# Patient Record
Sex: FEMALE | Race: WHITE | NOT HISPANIC OR LATINO | Employment: UNEMPLOYED | URBAN - METROPOLITAN AREA
[De-identification: names, ages, dates, MRNs, and addresses within clinical notes are randomized per-mention and may not be internally consistent; named-entity substitution may affect disease eponyms.]

---

## 2019-12-03 ENCOUNTER — OFFICE VISIT (OUTPATIENT)
Dept: ENDOCRINOLOGY | Facility: CLINIC | Age: 39
End: 2019-12-03

## 2019-12-03 VITALS
BODY MASS INDEX: 36.23 KG/M2 | WEIGHT: 212.2 LBS | SYSTOLIC BLOOD PRESSURE: 120 MMHG | HEART RATE: 80 BPM | DIASTOLIC BLOOD PRESSURE: 80 MMHG | OXYGEN SATURATION: 98 % | HEIGHT: 64 IN

## 2019-12-03 DIAGNOSIS — E03.9 ACQUIRED HYPOTHYROIDISM: ICD-10-CM

## 2019-12-03 DIAGNOSIS — IMO0002 DIABETES MELLITUS TYPE 1, UNCONTROLLED, WITH COMPLICATIONS: Primary | ICD-10-CM

## 2019-12-03 LAB
GLUCOSE BLDC GLUCOMTR-MCNC: 190 MG/DL (ref 70–130)
HBA1C MFR BLD: 5.6 %

## 2019-12-03 PROCEDURE — 83036 HEMOGLOBIN GLYCOSYLATED A1C: CPT | Performed by: INTERNAL MEDICINE

## 2019-12-03 PROCEDURE — 82947 ASSAY GLUCOSE BLOOD QUANT: CPT | Performed by: INTERNAL MEDICINE

## 2019-12-03 PROCEDURE — 99244 OFF/OP CNSLTJ NEW/EST MOD 40: CPT | Performed by: INTERNAL MEDICINE

## 2019-12-03 RX ORDER — BRIMONIDINE TARTRATE/TIMOLOL 0.2%-0.5%
DROPS OPHTHALMIC (EYE)
COMMUNITY
Start: 2019-12-02

## 2019-12-03 RX ORDER — LEVOTHYROXINE SODIUM 112 MCG
112 TABLET ORAL DAILY
Qty: 90 TABLET | Refills: 3 | Status: SHIPPED | OUTPATIENT
Start: 2019-12-03 | End: 2020-03-03 | Stop reason: SDUPTHER

## 2019-12-03 RX ORDER — NETARSUDIL 0.2 MG/ML
SOLUTION/ DROPS OPHTHALMIC; TOPICAL
COMMUNITY
Start: 2019-10-17

## 2019-12-03 RX ORDER — OMEPRAZOLE 40 MG/1
40 CAPSULE, DELAYED RELEASE ORAL DAILY
COMMUNITY
End: 2022-08-18 | Stop reason: DRUGHIGH

## 2019-12-03 RX ORDER — LOSARTAN POTASSIUM 25 MG/1
TABLET ORAL
COMMUNITY
Start: 2019-11-19

## 2019-12-03 RX ORDER — SYRING-NEEDL,DISP,INSUL,0.3 ML 31 GX5/16"
SYRINGE, EMPTY DISPOSABLE MISCELLANEOUS
COMMUNITY
Start: 2019-10-28 | End: 2020-08-11 | Stop reason: SDUPTHER

## 2019-12-03 RX ORDER — TACROLIMUS 0.5 MG/1
CAPSULE ORAL
COMMUNITY
Start: 2019-11-22

## 2019-12-03 RX ORDER — BRINZOLAMIDE/BRIMONIDINE TARTRATE 10; 2 MG/ML; MG/ML
SUSPENSION/ DROPS OPHTHALMIC
COMMUNITY
Start: 2019-10-09 | End: 2020-08-06

## 2019-12-03 RX ORDER — NORETHINDRONE ACETATE AND ETHINYL ESTRADIOL, ETHINYL ESTRADIOL AND FERROUS FUMARATE 1MG-10(24)
KIT ORAL
COMMUNITY
Start: 2019-11-14 | End: 2022-04-26 | Stop reason: ALTCHOICE

## 2019-12-03 RX ORDER — MYCOPHENOLATE MOFETIL 500 MG/1
TABLET ORAL
COMMUNITY
Start: 2019-11-18

## 2019-12-03 RX ORDER — LEVOTHYROXINE SODIUM 112 MCG
TABLET ORAL
COMMUNITY
Start: 2019-09-14 | End: 2019-12-03 | Stop reason: SDUPTHER

## 2019-12-03 RX ORDER — ERYTHROMYCIN 5 MG/G
OINTMENT OPHTHALMIC
COMMUNITY
Start: 2019-12-02 | End: 2020-08-06

## 2020-02-20 ENCOUNTER — TELEPHONE (OUTPATIENT)
Dept: ENDOCRINOLOGY | Facility: CLINIC | Age: 40
End: 2020-02-20

## 2020-02-20 NOTE — TELEPHONE ENCOUNTER
PATIENT CALLED TO REQUEST HUMALOG REFILL. PLEASE SEND ONE VIAL TO [facility] IN [place] THEN SEND THREE MONTH SUPPLY TO [facility]. PATIENT IS CONCERNED THAT SHE MAY RUN OUT BEFORE MAIL ORDER CAN DELIVER.       CALL BACK [phone]

## 2020-02-21 RX ORDER — INSULIN LISPRO 100 [IU]/ML
INJECTION, SOLUTION INTRAVENOUS; SUBCUTANEOUS
Qty: 30 ML | Refills: 3 | Status: SHIPPED | OUTPATIENT
Start: 2020-02-21 | End: 2020-02-21

## 2020-03-03 ENCOUNTER — OFFICE VISIT (OUTPATIENT)
Dept: ENDOCRINOLOGY | Facility: CLINIC | Age: 40
End: 2020-03-03

## 2020-03-03 VITALS
WEIGHT: 208.1 LBS | SYSTOLIC BLOOD PRESSURE: 118 MMHG | HEIGHT: 64 IN | OXYGEN SATURATION: 98 % | BODY MASS INDEX: 35.53 KG/M2 | HEART RATE: 76 BPM | DIASTOLIC BLOOD PRESSURE: 76 MMHG

## 2020-03-03 DIAGNOSIS — IMO0002 DIABETES MELLITUS TYPE 1, UNCONTROLLED, WITH COMPLICATIONS: Primary | ICD-10-CM

## 2020-03-03 DIAGNOSIS — Z46.81 INSULIN PUMP TITRATION: ICD-10-CM

## 2020-03-03 DIAGNOSIS — E03.9 ACQUIRED HYPOTHYROIDISM: ICD-10-CM

## 2020-03-03 LAB
EXPIRATION DATE: ABNORMAL
EXPIRATION DATE: NORMAL
GLUCOSE BLDC GLUCOMTR-MCNC: 158 MG/DL (ref 70–130)
HBA1C MFR BLD: 6.3 %
Lab: ABNORMAL
Lab: NORMAL

## 2020-03-03 PROCEDURE — 99214 OFFICE O/P EST MOD 30 MIN: CPT | Performed by: INTERNAL MEDICINE

## 2020-03-03 PROCEDURE — 83036 HEMOGLOBIN GLYCOSYLATED A1C: CPT | Performed by: INTERNAL MEDICINE

## 2020-03-03 PROCEDURE — 82947 ASSAY GLUCOSE BLOOD QUANT: CPT | Performed by: INTERNAL MEDICINE

## 2020-03-03 RX ORDER — LEVOTHYROXINE SODIUM 112 MCG
112 TABLET ORAL DAILY
Qty: 90 TABLET | Refills: 3 | Status: SHIPPED | OUTPATIENT
Start: 2020-03-03 | End: 2020-03-04 | Stop reason: SDUPTHER

## 2020-03-03 NOTE — PROGRESS NOTES
Subjective:     Chief Complaint   Patient presents with   • Diabetes     Follow UP   • Hypothyroidism      Ellie Meyer is a 39 y.o. female who is is being seen for follow-up  Type 1 diabetes mellitus.    The initial diagnosis of diabetes was made in childhood and she had variably  Controlled diabetes with multiple complications. Now she has a tight control < 6.5 most of the time.   Diabetic complications: nephropathy, retinopathy s/p surgeries vitrectomy and multiple laser surgeries and injections; kidney disease s/p renal transplant. She is followed with the transplant team in [facility].     Current diabetic medications include insulin Humalog via insulin pump [employer]. She has 670G but doesn't use sensor because of the cost. She prefers Humalog, feels that it works better.  Her glucose is higher lately.     Monitoring  - checks glucose  7 times per day  Insulin pump downloaded and reviewed the data. Glucose is  most of the time. Occasional 190 after meals in the evening.   Hypoglycemia: occasional    Other med problems:Hypothyroidism is managed with levothyroxine 112 mcg daily. TSh was normal in 12/2019 1.62        Past Medical History:   Diagnosis Date   • Acid reflux    • Diabetes mellitus type I (CMS/[facility])    • Hypothyroidism    • PCOS (polycystic ovarian syndrome)      The following portions of the patient's history were reviewed and updated as appropriate: allergies, current medications, past family history, past social history, past surgical history and problem list.    MEDICATIONS    Current Outpatient Medications:   •  COMBIGAN 0.2-0.5 % ophthalmic solution, , Disp: , Rfl:   •  erythromycin (ROMYCIN) 5 MG/GM ophthalmic ointment, , Disp: , Rfl:   •  HUMALOG 100 UNIT/ML injection, 80 units via insulin pump, Disp: 90 mL, Rfl: 1  •  HUMALOG 100 UNIT/ML injection, 80 units via Insulin Pump Daily, Disp: 90 mL, Rfl: 3  •  LO LOESTRIN FE 1 MG-10 MCG / 10 MCG tablet, , Disp: , Rfl:   •  losartan  "(COZAAR) 25 MG tablet, , Disp: , Rfl:   •  mycophenolate (CELLCEPT) 500 MG tablet, , Disp: , Rfl:   •  omeprazole (priLOSEC) 40 MG capsule, Take 40 mg by mouth Daily., Disp: , Rfl:   •  RHOPRESSA 0.02 % solution, , Disp: , Rfl:   •  SIMBRINZA 1-0.2 % suspension, , Disp: , Rfl:   •  SYNTHROID 112 MCG tablet, Take 1 tablet by mouth Daily., Disp: 90 tablet, Rfl: 3  •  tacrolimus (PROGRAF) 0.5 MG capsule, , Disp: , Rfl:   •  TRUEPLUS INSULIN SYRINGE 31G X 5/16\" 0.3 ML misc, , Disp: , Rfl:     Review of Systems  Review of Systems   Constitutional: Positive for fatigue.   Eyes: Positive for redness and visual disturbance.   Genitourinary: Negative for flank pain, menstrual problem and pelvic pressure.   Neurological: Positive for dizziness and headache.   Psychiatric/Behavioral: Positive for sleep disturbance.   All other systems reviewed and are negative.        Objective:      /76   Pulse 76   Ht 162.6 cm (64\")   Wt 94.4 kg (208 lb 1.6 oz)   SpO2 98%   BMI 35.72 kg/m² Body mass index is 35.72 kg/m².  Physical Exam   Constitutional: She is oriented to person, place, and time. She appears well-developed and well-nourished.   HENT:   Head: Normocephalic and atraumatic.   Mouth/Throat: Oropharynx is clear and moist.   Cardiovascular: Normal rate, regular rhythm, normal heart sounds and intact distal pulses.   Pulmonary/Chest: Effort normal and breath sounds normal.   Musculoskeletal: She exhibits no edema.   Neurological: She is alert and oriented to person, place, and time.   Psychiatric: She has a normal mood and affect. Thought content normal.           LABS AND IMAGING    Labs:   Lab Results   Component Value Date    HGBA1C 6.3 03/03/2020    HGBA1C 5.6 12/03/2019     Office Visit on 03/03/2020   Component Date Value Ref Range Status   • Glucose 03/03/2020 158* 70 - 130 mg/dL Final   • Lot Number 03/03/2020 [id number]   Final   • Expiration Date 03/03/2020 2020/08/22   Final   • Hemoglobin A1C 03/03/2020 6.3  % " Final   • Lot Number 03/03/2020 [id number]   Final   • Expiration Date 03/03/2020 2021-12-10   Final             Assessment:         Diagnoses and all orders for this visit:    Diabetes mellitus type 1, uncontrolled, with complications (CMS/[facility])  -     POC Glucose Fingerstick  -     POC Glycosylated Hemoglobin (Hb A1C)    Acquired hypothyroidism    Insulin pump titration    Other orders  -     HUMALOG 100 UNIT/ML injection; 80 units via Insulin Pump Daily  -     SYNTHROID 112 MCG tablet; Take 1 tablet by mouth Daily.        Plan:      -Insulin pump downloaded and data reviewed. Glucose is higher and adjustments were made. I have noted that she is using manual bolus, encouraged to use bolus wizard. I have made some adjustments on the carb ratio and   -Humalog rx sent and Meds refilled.   She is up to date with vaccination and screening.   Check thyroid function test - 1.65. Levothyroxine 112 mcg daily     Sees eye specialist every 4 weeks. Transplant team is once a year. Labs done with transplant team     Follow-up in 3 months

## 2020-03-04 ENCOUNTER — TELEPHONE (OUTPATIENT)
Dept: ENDOCRINOLOGY | Facility: CLINIC | Age: 40
End: 2020-03-04

## 2020-03-04 RX ORDER — LEVOTHYROXINE SODIUM 112 UG/1
112 TABLET ORAL DAILY
Qty: 90 TABLET | Refills: 3 | Status: SHIPPED | OUTPATIENT
Start: 2020-03-04 | End: 2020-03-04 | Stop reason: SDUPTHER

## 2020-03-04 RX ORDER — LEVOTHYROXINE SODIUM 112 UG/1
112 TABLET ORAL DAILY
Qty: 90 TABLET | Refills: 3 | Status: SHIPPED | OUTPATIENT
Start: 2020-03-04 | End: 2021-02-24 | Stop reason: SDUPTHER

## 2020-03-04 NOTE — TELEPHONE ENCOUNTER
[facility] in for [facility]    pts number  if you have questions   [phone]

## 2020-04-29 ENCOUNTER — TELEPHONE (OUTPATIENT)
Dept: ENDOCRINOLOGY | Facility: CLINIC | Age: 40
End: 2020-04-29

## 2020-04-29 NOTE — TELEPHONE ENCOUNTER
PATIENT NEEDS PRESCRIPTION FOR PUMP SUPPLIES TO BE SENT TO [facility]. PATIENT ID NUMBER IS [id number]. # [phone] [facility] PUMP SUPPLY LINE

## 2020-04-29 NOTE — TELEPHONE ENCOUNTER
Called and spoke with [facility] and they have it down that Dr Colvin sees this patient so they would not send anything to us. They stated the patient needs to call and update a provider change so they can send the order form to us to complete.     LVM and told pt this information.

## 2020-05-27 ENCOUNTER — TELEPHONE (OUTPATIENT)
Dept: ENDOCRINOLOGY | Facility: CLINIC | Age: 40
End: 2020-05-27

## 2020-05-27 NOTE — TELEPHONE ENCOUNTER
Returned [facility] called, advised them that it was faxed on 5/7/20 by Isabela, however no fax confirmation cover sheet.  I re faxed to: [phone] per [facility] RRequest

## 2020-05-27 NOTE — TELEPHONE ENCOUNTER
[facility] FAXED US A REQUEST FOR ORDERS FROM DR. SANFORD ON 5/22/2020. THEY ARE CALLING TO CHECK STATUS OF PAPERWORK.  PHONE NUMBER [phone]

## 2020-08-06 ENCOUNTER — OFFICE VISIT (OUTPATIENT)
Dept: ENDOCRINOLOGY | Facility: CLINIC | Age: 40
End: 2020-08-06

## 2020-08-06 VITALS
BODY MASS INDEX: 33.84 KG/M2 | HEART RATE: 76 BPM | DIASTOLIC BLOOD PRESSURE: 74 MMHG | WEIGHT: 198.2 LBS | RESPIRATION RATE: 18 BRPM | HEIGHT: 64 IN | SYSTOLIC BLOOD PRESSURE: 122 MMHG | OXYGEN SATURATION: 98 %

## 2020-08-06 DIAGNOSIS — IMO0002 DIABETES MELLITUS TYPE 1, UNCONTROLLED, WITH COMPLICATIONS: Primary | ICD-10-CM

## 2020-08-06 DIAGNOSIS — Z46.81 INSULIN PUMP TITRATION: ICD-10-CM

## 2020-08-06 DIAGNOSIS — L65.9 HAIR LOSS: ICD-10-CM

## 2020-08-06 DIAGNOSIS — E03.9 ACQUIRED HYPOTHYROIDISM: ICD-10-CM

## 2020-08-06 LAB
EXPIRATION DATE: NORMAL
EXPIRATION DATE: NORMAL
GLUCOSE BLDC GLUCOMTR-MCNC: 93 MG/DL (ref 70–130)
HBA1C MFR BLD: 5.8 %
Lab: NORMAL
Lab: NORMAL

## 2020-08-06 PROCEDURE — 83036 HEMOGLOBIN GLYCOSYLATED A1C: CPT | Performed by: INTERNAL MEDICINE

## 2020-08-06 PROCEDURE — 82947 ASSAY GLUCOSE BLOOD QUANT: CPT | Performed by: INTERNAL MEDICINE

## 2020-08-06 PROCEDURE — 99214 OFFICE O/P EST MOD 30 MIN: CPT | Performed by: INTERNAL MEDICINE

## 2020-08-06 NOTE — PROGRESS NOTES
Subjective:     Chief Complaint   Patient presents with   • Diabetes   • Hypothyroidism      Ellie Meyer is a 40 y.o. female who is is being seen for follow-up  Type 1 diabetes mellitus.    The initial diagnosis of diabetes was made in childhood and she had variably  Controlled diabetes with multiple complications.  Diabetic complications: nephropathy, retinopathy s/p surgeries vitrectomy and multiple laser surgeries and injections; kidney disease s/p renal transplant. She is followed with the transplant team in [facility].     Current diabetic medications include insulin Humalog via insulin pump [employer]. She has 670G but doesn't use sensor because of the cost. She prefers Humalog, feels that it works better.      Monitoring  - checks glucose  7 times per day  Insulin pump downloaded and reviewed the data. Glucose is  most of the time. Occasional 190 after meals in the evening.   Hypoglycemia: occasional    Hypothyroidism is managed with levothyroxine 112 mcg daily.     C/o hair loss that is working worse. No menstrual problems - takes low dose OCP continuously. She is not fasting today.     Past Medical History:   Diagnosis Date   • Acid reflux    • Diabetes mellitus type I (CMS/HCC)    • Hypothyroidism    • PCOS (polycystic ovarian syndrome)      The following portions of the patient's history were reviewed and updated as appropriate: allergies, current medications, past family history, past social history, past surgical history and problem list.    MEDICATIONS    Current Outpatient Medications:   •  COMBIGAN 0.2-0.5 % ophthalmic solution, , Disp: , Rfl:   •  HUMALOG 100 UNIT/ML injection, 80 units via Insulin Pump Daily, Disp: 90 mL, Rfl: 3  •  levothyroxine (SYNTHROID) 112 MCG tablet, Take 1 tablet by mouth Daily., Disp: 90 tablet, Rfl: 3  •  LO LOESTRIN FE 1 MG-10 MCG / 10 MCG tablet, , Disp: , Rfl:   •  losartan (COZAAR) 25 MG tablet, , Disp: , Rfl:   •  mycophenolate (CELLCEPT) 500 MG tablet, ,  "Disp: , Rfl:   •  omeprazole (priLOSEC) 40 MG capsule, Take 40 mg by mouth Daily., Disp: , Rfl:   •  RHOPRESSA 0.02 % solution, , Disp: , Rfl:   •  tacrolimus (PROGRAF) 0.5 MG capsule, , Disp: , Rfl:   •  TRUEPLUS INSULIN SYRINGE 31G X 5/16\" 0.3 ML misc, , Disp: , Rfl:     Review of Systems  Review of Systems   Constitutional: Positive for fatigue.   Eyes: Positive for redness and visual disturbance.   Genitourinary: Negative for flank pain, menstrual problem and pelvic pressure.   Skin:        Hair loss   Neurological: Positive for dizziness and headache.   Psychiatric/Behavioral: Positive for sleep disturbance.   All other systems reviewed and are negative.        Objective:      /74   Pulse 76   Resp 18   Ht 162.6 cm (64\")   Wt 89.9 kg (198 lb 3.2 oz)   SpO2 98%   BMI 34.02 kg/m² Body mass index is 34.02 kg/m².  Physical Exam   Constitutional: She is oriented to person, place, and time. She appears well-developed and well-nourished.   HENT:   Head: Normocephalic and atraumatic.   Cardiovascular: Normal rate, regular rhythm, normal heart sounds and intact distal pulses.   Pulmonary/Chest: Effort normal and breath sounds normal.   Musculoskeletal: She exhibits no edema.   Neurological: She is alert and oriented to person, place, and time.   Psychiatric: She has a normal mood and affect. Thought content normal.           LABS AND IMAGING    Labs:   Lab Results   Component Value Date    HGBA1C 5.8 08/06/2020    HGBA1C 6.3 03/03/2020    HGBA1C 5.6 12/03/2019     Office Visit on 08/06/2020   Component Date Value Ref Range Status   • Glucose 08/06/2020 93  70 - 130 mg/dL Final   • Lot Number 08/06/2020 [id number]   Final   • Expiration Date 08/06/2020 12/28/2020   Final   • Hemoglobin A1C 08/06/2020 5.8  % Final   • Lot Number 08/06/2020 [id number]   Final   • Expiration Date 08/06/2020 03/13/2022   Final             Assessment:         Diagnoses and all orders for this visit:    Diabetes mellitus type 1, " uncontrolled, with complications (CMS/[facility])  -     POC Glucose  -     POC Glycosylated Hemoglobin (Hb A1C)  -     Vitamin D 25 Hydroxy; Future    Acquired hypothyroidism  -     TSH; Future  -     T4, Free; Future    Insulin pump titration  -     Lipid Panel; Future  -     Comprehensive Metabolic Panel; Future    Hair loss        Plan:      -Insulin pump downloaded and data reviewed. Glucose is higher and adjustments were made. I have noted that she is using manual bolus, encouraged to use bolus wizard. I have made some adjustments on the carb ratio and   -Humalog rx sent and Meds refilled.   - recommend the dual bolus at dinner.     Check thyroid function test - cont Levothyroxine 112 mcg daily     Sees eye specialist every 4 weeks. Transplant team is once a year.     She will return for fasting labs next week.       Follow-up in 3 months

## 2020-08-11 RX ORDER — SYRING-NEEDL,DISP,INSUL,0.3 ML 31 GX5/16"
SYRINGE, EMPTY DISPOSABLE MISCELLANEOUS
Qty: 100 EACH | Refills: 5 | Status: SHIPPED | OUTPATIENT
Start: 2020-08-11 | End: 2023-03-15 | Stop reason: SDUPTHER

## 2020-08-11 NOTE — TELEPHONE ENCOUNTER
PATIENT IS HAVING ISSUES TRYING TO GET [employer] SUPPLIES FROM THE COMPANY SHE GETS THEM FROM. THEY ARE CALLING TO SEE IF WE CAN GIVE SOME SAMPLES OF [employer] SUPPLIES TILL THEY CAN GET WHAT THEY NEED FROM SUPPLIER. PHONE [phone]. PART NUMBER IS [id number]. THEY ALSO NEED NEEDLES TO BE SENT TO PHARMACY.

## 2020-08-11 NOTE — TELEPHONE ENCOUNTER
We have faxed the necessary document to [facility] twice the past 3 months.  Actually a total of 7 times.  I refaxed all the previous requests to [facility] with [facility] marked on top.  I know we have some samples for [employer] but I am uncertain as to what she needs for her pump.  Do you mind to help me locate the correct ones and I will notify patient?   They are in the supply room

## 2020-11-04 ENCOUNTER — LAB (OUTPATIENT)
Dept: LAB | Facility: HOSPITAL | Age: 40
End: 2020-11-04

## 2020-11-04 ENCOUNTER — OFFICE VISIT (OUTPATIENT)
Dept: ENDOCRINOLOGY | Facility: CLINIC | Age: 40
End: 2020-11-04

## 2020-11-04 VITALS
TEMPERATURE: 99.3 F | HEART RATE: 76 BPM | HEIGHT: 64 IN | BODY MASS INDEX: 33.8 KG/M2 | DIASTOLIC BLOOD PRESSURE: 80 MMHG | SYSTOLIC BLOOD PRESSURE: 120 MMHG | WEIGHT: 198 LBS | OXYGEN SATURATION: 98 %

## 2020-11-04 DIAGNOSIS — IMO0002 DIABETES MELLITUS TYPE 1, UNCONTROLLED, WITH COMPLICATIONS: Primary | ICD-10-CM

## 2020-11-04 DIAGNOSIS — Z46.81 INSULIN PUMP TITRATION: ICD-10-CM

## 2020-11-04 DIAGNOSIS — E03.9 ACQUIRED HYPOTHYROIDISM: ICD-10-CM

## 2020-11-04 LAB
EXPIRATION DATE: ABNORMAL
EXPIRATION DATE: NORMAL
GLUCOSE BLDC GLUCOMTR-MCNC: 69 MG/DL (ref 70–130)
HBA1C MFR BLD: 6 %
Lab: ABNORMAL
Lab: NORMAL

## 2020-11-04 PROCEDURE — 82306 VITAMIN D 25 HYDROXY: CPT | Performed by: INTERNAL MEDICINE

## 2020-11-04 PROCEDURE — 82947 ASSAY GLUCOSE BLOOD QUANT: CPT | Performed by: INTERNAL MEDICINE

## 2020-11-04 PROCEDURE — 83036 HEMOGLOBIN GLYCOSYLATED A1C: CPT | Performed by: INTERNAL MEDICINE

## 2020-11-04 PROCEDURE — 99214 OFFICE O/P EST MOD 30 MIN: CPT | Performed by: INTERNAL MEDICINE

## 2020-11-04 PROCEDURE — 84443 ASSAY THYROID STIM HORMONE: CPT | Performed by: INTERNAL MEDICINE

## 2020-11-04 PROCEDURE — 80053 COMPREHEN METABOLIC PANEL: CPT | Performed by: INTERNAL MEDICINE

## 2020-11-04 PROCEDURE — 84439 ASSAY OF FREE THYROXINE: CPT | Performed by: INTERNAL MEDICINE

## 2020-11-04 PROCEDURE — 80061 LIPID PANEL: CPT | Performed by: INTERNAL MEDICINE

## 2020-11-04 RX ORDER — METHOCARBAMOL 500 MG/1
TABLET, FILM COATED ORAL
COMMUNITY
Start: 2020-09-16

## 2020-11-04 NOTE — PROGRESS NOTES
Subjective:     Chief Complaint   Patient presents with   • Diabetes     Follow UP:  Noticed that when she replaces her pump, she gets an infection on the injection site .  Has not eaten since 9am this am       Ellie Meyer is a 40 y.o. female who is is being seen for follow-up  Type 1 diabetes mellitus.    The initial diagnosis of diabetes was made in childhood and she had variably glycemic control with multiple complications.  Diabetic complications: nephropathy, retinopathy s/p surgeries vitrectomy and multiple laser surgeries and injections; kidney disease s/p renal transplant. She is followed with the transplant team in [facility].     Current diabetic medications include insulin Humalog via insulin pump [employer]. She has 670G but doesn't use sensor because of the cost. She prefers Humalog, feels that it works better.  She reported having infection at the site each time she is changing the site    Monitoring  - checks glucose  7 times per day  Insulin pump downloaded and reviewed the data. She is not using the sensor, uses manual bolus. She calculated carbs and does her ratio manually   Hypoglycemia: occasional    Hypothyroidism is managed with levothyroxine 112 mcg daily.     Patient reported that towards the end of the third day she develops erythema of the site and swelling after it is removed. She uses mupirocin cream with great effect and redness disappears in 2  Days.     Past Medical History:   Diagnosis Date   • Acid reflux    • Diabetes mellitus type I (CMS/[facility])    • Hypothyroidism    • PCOS (polycystic ovarian syndrome)      The following portions of the patient's history were reviewed and updated as appropriate: allergies, current medications, past family history, past social history, past surgical history and problem list.    MEDICATIONS    Current Outpatient Medications:   •  COMBIGAN 0.2-0.5 % ophthalmic solution, , Disp: , Rfl:   •  HUMALOG 100 UNIT/ML injection, 80 units via Insulin Pump  "Daily, Disp: 90 mL, Rfl: 3  •  levothyroxine (SYNTHROID) 112 MCG tablet, Take 1 tablet by mouth Daily., Disp: 90 tablet, Rfl: 3  •  LO LOESTRIN FE 1 MG-10 MCG / 10 MCG tablet, , Disp: , Rfl:   •  losartan (COZAAR) 25 MG tablet, , Disp: , Rfl:   •  methocarbamol (ROBAXIN) 500 MG tablet, , Disp: , Rfl:   •  mycophenolate (CELLCEPT) 500 MG tablet, , Disp: , Rfl:   •  omeprazole (priLOSEC) 40 MG capsule, Take 40 mg by mouth Daily., Disp: , Rfl:   •  RHOPRESSA 0.02 % solution, , Disp: , Rfl:   •  tacrolimus (PROGRAF) 0.5 MG capsule, , Disp: , Rfl:   •  TRUEPLUS INSULIN SYRINGE 31G X 5/16\" 0.3 ML misc, Use three times daily with Insulin distribution, Disp: 100 each, Rfl: 5    Review of Systems  Review of Systems   Constitutional: Positive for fatigue.   Eyes: Positive for redness and visual disturbance.   Genitourinary: Negative for flank pain, menstrual problem and pelvic pressure.   Skin: Positive for rash.        Hair loss   Neurological: Positive for dizziness and headache.   Psychiatric/Behavioral: Positive for sleep disturbance.   All other systems reviewed and are negative.        Objective:      /80   Pulse 76   Temp 99.3 °F (37.4 °C)   Ht 162.6 cm (64\")   Wt 89.8 kg (198 lb)   SpO2 98%   BMI 33.99 kg/m² Body mass index is 33.99 kg/m².  Physical Exam   Constitutional: She is oriented to person, place, and time. She appears well-developed and well-nourished.   HENT:   Head: Normocephalic and atraumatic.   Cardiovascular: Normal rate, regular rhythm, normal heart sounds and normal pulses.   Pulmonary/Chest: Effort normal and breath sounds normal.   Musculoskeletal: No swelling.   Neurological: She is alert and oriented to person, place, and time.   Psychiatric: Thought content normal.           LABS AND IMAGING    Labs:   Lab Results   Component Value Date    HGBA1C 6.0 11/04/2020    HGBA1C 5.8 08/06/2020    HGBA1C 6.3 03/03/2020     Office Visit on 11/04/2020   Component Date Value Ref Range Status   • " Glucose 11/04/2020 69* 70 - 130 mg/dL Final   • Lot Number 11/04/2020 [id number]   Final   • Expiration Date 11/04/2020 06/02/2021   Final   • Hemoglobin A1C 11/04/2020 6.0  % Final   • Lot Number 11/04/2020 [id number]   Final   • Expiration Date 11/04/2020 08/17/2022   Final   Office Visit on 08/06/2020   Component Date Value Ref Range Status   • Glucose 08/06/2020 93  70 - 130 mg/dL Final   • Lot Number 08/06/2020 [id number]   Final   • Expiration Date 08/06/2020 12/28/2020   Final   • Hemoglobin A1C 08/06/2020 5.8  % Final   • Lot Number 08/06/2020 [id number]   Final   • Expiration Date 08/06/2020 03/13/2022   Final             Assessment:         Diagnoses and all orders for this visit:    Diabetes mellitus type 1, uncontrolled, with complications (CMS/[facility])  -     POC Glucose, Blood  -     POC Glycosylated Hemoglobin (Hb A1C)  -     Vitamin D 25 Hydroxy    Acquired hypothyroidism  -     T4, Free  -     TSH    Insulin pump titration  -     Comprehensive Metabolic Panel  -     Lipid Panel    Other orders  -     methocarbamol (ROBAXIN) 500 MG tablet        Plan:      -Insulin pump downloaded and data reviewed. Glucose is higher and adjustments were made. I have noted that she is using manual bolus, encouraged to use bolus wizard. I have made some adjustments on the carb ratio and   -Humalog rx sent and Meds refilled.     Check thyroid function test - cont Levothyroxine 112 mcg daily     Sees eye specialist every 4 weeks. Transplant team is once a year.   Nephrology visit completed last week.     Fasting labs today  Follow-up in 3 months

## 2020-11-05 LAB
25(OH)D3 SERPL-MCNC: 37.2 NG/ML (ref 30–100)
ALBUMIN SERPL-MCNC: 4.2 G/DL (ref 3.5–5.2)
ALBUMIN/GLOB SERPL: 1.4 G/DL
ALP SERPL-CCNC: 53 U/L (ref 39–117)
ALT SERPL W P-5'-P-CCNC: 11 U/L (ref 1–33)
ANION GAP SERPL CALCULATED.3IONS-SCNC: 6.5 MMOL/L (ref 5–15)
AST SERPL-CCNC: 13 U/L (ref 1–32)
BILIRUB SERPL-MCNC: 0.5 MG/DL (ref 0–1.2)
BUN SERPL-MCNC: 12 MG/DL (ref 6–20)
BUN/CREAT SERPL: 11.3 (ref 7–25)
CALCIUM SPEC-SCNC: 9.2 MG/DL (ref 8.6–10.5)
CHLORIDE SERPL-SCNC: 104 MMOL/L (ref 98–107)
CHOLEST SERPL-MCNC: 174 MG/DL (ref 0–200)
CO2 SERPL-SCNC: 27.5 MMOL/L (ref 22–29)
CREAT SERPL-MCNC: 1.06 MG/DL (ref 0.57–1)
GFR SERPL CREATININE-BSD FRML MDRD: 57 ML/MIN/1.73
GLOBULIN UR ELPH-MCNC: 3.1 GM/DL
GLUCOSE SERPL-MCNC: 66 MG/DL (ref 65–99)
HDLC SERPL-MCNC: 41 MG/DL (ref 40–60)
LDLC SERPL CALC-MCNC: 115 MG/DL (ref 0–100)
LDLC/HDLC SERPL: 2.78 {RATIO}
POTASSIUM SERPL-SCNC: 4.1 MMOL/L (ref 3.5–5.2)
PROT SERPL-MCNC: 7.3 G/DL (ref 6–8.5)
SODIUM SERPL-SCNC: 138 MMOL/L (ref 136–145)
T4 FREE SERPL-MCNC: 1.48 NG/DL (ref 0.93–1.7)
TRIGL SERPL-MCNC: 96 MG/DL (ref 0–150)
TSH SERPL DL<=0.05 MIU/L-ACNC: 1.82 UIU/ML (ref 0.27–4.2)
VLDLC SERPL-MCNC: 18 MG/DL (ref 5–40)

## 2021-02-24 ENCOUNTER — OFFICE VISIT (OUTPATIENT)
Dept: ENDOCRINOLOGY | Facility: CLINIC | Age: 41
End: 2021-02-24

## 2021-02-24 VITALS
WEIGHT: 197.6 LBS | OXYGEN SATURATION: 99 % | HEART RATE: 84 BPM | DIASTOLIC BLOOD PRESSURE: 68 MMHG | BODY MASS INDEX: 33.73 KG/M2 | TEMPERATURE: 98.9 F | SYSTOLIC BLOOD PRESSURE: 115 MMHG | HEIGHT: 64 IN

## 2021-02-24 DIAGNOSIS — Z46.81 INSULIN PUMP TITRATION: ICD-10-CM

## 2021-02-24 DIAGNOSIS — E03.9 ACQUIRED HYPOTHYROIDISM: ICD-10-CM

## 2021-02-24 DIAGNOSIS — IMO0002 DIABETES MELLITUS TYPE 1, UNCONTROLLED, WITH COMPLICATIONS: Primary | ICD-10-CM

## 2021-02-24 LAB
EXPIRATION DATE: ABNORMAL
EXPIRATION DATE: NORMAL
GLUCOSE BLDC GLUCOMTR-MCNC: 210 MG/DL (ref 70–130)
HBA1C MFR BLD: 5.5 %
Lab: ABNORMAL
Lab: NORMAL

## 2021-02-24 PROCEDURE — 99214 OFFICE O/P EST MOD 30 MIN: CPT | Performed by: INTERNAL MEDICINE

## 2021-02-24 PROCEDURE — 82947 ASSAY GLUCOSE BLOOD QUANT: CPT | Performed by: INTERNAL MEDICINE

## 2021-02-24 PROCEDURE — 83036 HEMOGLOBIN GLYCOSYLATED A1C: CPT | Performed by: INTERNAL MEDICINE

## 2021-02-24 RX ORDER — LEVOTHYROXINE SODIUM 112 UG/1
112 TABLET ORAL DAILY
Qty: 90 TABLET | Refills: 3 | Status: SHIPPED | OUTPATIENT
Start: 2021-02-24 | End: 2021-10-22 | Stop reason: SDUPTHER

## 2021-02-24 RX ORDER — TACROLIMUS 1 MG/1
CAPSULE ORAL
COMMUNITY
Start: 2021-02-10

## 2021-02-24 NOTE — PROGRESS NOTES
Subjective:     Chief Complaint   Patient presents with   • Diabetes     Follow uP :  Has noticed that when she changes site, her blood sugars are higher, but then comes down    • Hypothyroidism      Ellie Meyer is a 40 y.o. female who is is being seen for follow-up  Type 1 diabetes mellitus.    The initial diagnosis of diabetes was made in childhood and she had variably glycemic control with multiple complications.  Diabetic complications: nephropathy, retinopathy s/p surgeries vitrectomy and multiple laser surgeries and injections; kidney disease s/p renal transplant. She is followed with the transplant team in [facility].     Current diabetic medications include insulin Humalog via insulin pump [employer]. She has 670G but doesn't use sensor because of the cost. She prefers Humalog, feels that it works better.  She reported having infection at the site     Monitoring  - checks glucose  7 times per day  Insulin pump downloaded and reviewed the data. She is not using the sensor, uses manual bolus. She calculated carbs and does her ratio manually   Hypoglycemia: occasional    Hypothyroidism is managed with levothyroxine 112 mcg daily.       MEDICATIONS    Current Outpatient Medications:   •  COMBIGAN 0.2-0.5 % ophthalmic solution, , Disp: , Rfl:   •  HUMALOG 100 UNIT/ML injection, 80 units via Insulin Pump Daily, Disp: 90 mL, Rfl: 3  •  levothyroxine (SYNTHROID) 112 MCG tablet, Take 1 tablet by mouth Daily., Disp: 90 tablet, Rfl: 3  •  LO LOESTRIN FE 1 MG-10 MCG / 10 MCG tablet, , Disp: , Rfl:   •  losartan (COZAAR) 25 MG tablet, , Disp: , Rfl:   •  methocarbamol (ROBAXIN) 500 MG tablet, , Disp: , Rfl:   •  mycophenolate (CELLCEPT) 500 MG tablet, , Disp: , Rfl:   •  omeprazole (priLOSEC) 40 MG capsule, Take 40 mg by mouth Daily., Disp: , Rfl:   •  RHOPRESSA 0.02 % solution, , Disp: , Rfl:   •  tacrolimus (PROGRAF) 0.5 MG capsule, , Disp: , Rfl:   •  tacrolimus (PROGRAF) 1 MG capsule, , Disp: , Rfl:   •   "TRUEPLUS INSULIN SYRINGE 31G X 5/16\" 0.3 ML misc, Use three times daily with Insulin distribution, Disp: 100 each, Rfl: 5    Review of Systems  Review of Systems   Constitutional: Positive for fatigue.   Eyes: Positive for redness and visual disturbance.   Genitourinary: Negative for flank pain, menstrual problem and pelvic pressure.   Skin: Positive for rash.        Hair loss   Neurological: Positive for dizziness and headache.   Psychiatric/Behavioral: Positive for sleep disturbance.   All other systems reviewed and are negative.        Objective:      /68   Pulse 84   Temp 98.9 °F (37.2 °C)   Ht 162.6 cm (64\")   Wt 89.6 kg (197 lb 9.6 oz)   SpO2 99%   BMI 33.92 kg/m² Body mass index is 33.92 kg/m².  Physical Exam   Constitutional: She is oriented to person, place, and time. She appears well-developed and well-nourished.   HENT:   Head: Normocephalic and atraumatic.   Cardiovascular: Normal rate, regular rhythm, normal heart sounds and normal pulses.   Pulmonary/Chest: Effort normal and breath sounds normal.   Musculoskeletal: No swelling.   Neurological: She is alert and oriented to person, place, and time.   Psychiatric: Thought content normal.           LABS AND IMAGING    Labs:   Lab Results   Component Value Date    HGBA1C 5.5 02/24/2021    HGBA1C 6.0 11/04/2020    HGBA1C 5.8 08/06/2020     Office Visit on 02/24/2021   Component Date Value Ref Range Status   • Glucose 02/24/2021 210* 70 - 130 mg/dL Final   • Lot Number 02/24/2021 [id number]   Final   • Expiration Date 02/24/2021 07/01/2021   Final   • Hemoglobin A1C 02/24/2021 5.5  % Final   • Lot Number 02/24/2021 [id number]   Final   • Expiration Date 02/24/2021 11/24/2022   Final             Assessment:         Diagnoses and all orders for this visit:    Diabetes mellitus type 1, uncontrolled, with complications (CMS/[facility])  -     POC Glucose, Blood  -     POC Glycosylated Hemoglobin (Hb A1C)    Acquired hypothyroidism    Insulin pump " titration    Other orders  -     tacrolimus (PROGRAF) 1 MG capsule        Plan:      -Insulin pump downloaded and data reviewed. Glucose is higher and adjustments were made. I have noted that she is using manual bolus, encouraged to use bolus wizard. I have made some adjustments on the carb ratio.   -A1C  -Humalog rx sent and Meds refilled.     Check thyroid function test - cont Levothyroxine 112 mcg daily     Sees eye specialist every 4 weeks. Transplant team is once a year.   Nephrology visit every 4-6 months.    Follow-up in 3 months

## 2021-04-12 NOTE — TELEPHONE ENCOUNTER
Refill request.    LOV:  21  Future visit:  21    Last refill:  21  Q.  90 mL  R.  3  Si units via Insulin Pump Daily      [facility].

## 2021-10-22 ENCOUNTER — OFFICE VISIT (OUTPATIENT)
Dept: ENDOCRINOLOGY | Facility: CLINIC | Age: 41
End: 2021-10-22

## 2021-10-22 VITALS
DIASTOLIC BLOOD PRESSURE: 76 MMHG | HEIGHT: 64 IN | SYSTOLIC BLOOD PRESSURE: 118 MMHG | HEART RATE: 83 BPM | WEIGHT: 188.5 LBS | BODY MASS INDEX: 32.18 KG/M2 | OXYGEN SATURATION: 99 %

## 2021-10-22 DIAGNOSIS — E10.65 TYPE 1 DIABETES MELLITUS WITH HYPERGLYCEMIA (HCC): Primary | ICD-10-CM

## 2021-10-22 DIAGNOSIS — E03.9 ACQUIRED HYPOTHYROIDISM: ICD-10-CM

## 2021-10-22 LAB
EXPIRATION DATE: NORMAL
EXPIRATION DATE: NORMAL
GLUCOSE BLDC GLUCOMTR-MCNC: 73 MG/DL (ref 70–130)
HBA1C MFR BLD: 6.5 %
Lab: NORMAL
Lab: NORMAL

## 2021-10-22 PROCEDURE — 82947 ASSAY GLUCOSE BLOOD QUANT: CPT | Performed by: INTERNAL MEDICINE

## 2021-10-22 PROCEDURE — 83036 HEMOGLOBIN GLYCOSYLATED A1C: CPT | Performed by: INTERNAL MEDICINE

## 2021-10-22 PROCEDURE — 99214 OFFICE O/P EST MOD 30 MIN: CPT | Performed by: INTERNAL MEDICINE

## 2021-10-22 RX ORDER — METHOCARBAMOL 500 MG/1
500 TABLET, FILM COATED ORAL 3 TIMES DAILY
COMMUNITY
Start: 2021-05-04 | End: 2021-10-22 | Stop reason: SDUPTHER

## 2021-10-22 RX ORDER — LEVOTHYROXINE SODIUM 112 UG/1
112 TABLET ORAL DAILY
Qty: 90 TABLET | Refills: 3 | Status: SHIPPED | OUTPATIENT
Start: 2021-10-22 | End: 2022-08-18 | Stop reason: SDUPTHER

## 2021-10-22 NOTE — PATIENT INSTRUCTIONS
Results for orders placed or performed in visit on 10/22/21   POC Glycosylated Hemoglobin (Hb A1C)    Specimen: Blood   Result Value Ref Range    Hemoglobin A1C 6.5 %    Lot Number [id number]     Expiration Date 05/19/2023    POC Glucose, Blood    Specimen: Blood   Result Value Ref Range    Glucose 73 70 - 130 mg/dL    Lot Number [id number]     Expiration Date 04/14/2022

## 2021-10-22 NOTE — PROGRESS NOTES
"Subjective:     Chief Complaint   Patient presents with   • Diabetes     Diabetes   • Hypothyroidism      Ellie Meyer is a 41 y.o. female who is is being seen for follow-up  Type 1 diabetes mellitus.    The initial diagnosis of diabetes was made in childhood and she had variably glycemic control with multiple complications.  Diabetic complications: nephropathy, retinopathy s/p surgeries vitrectomy and multiple laser surgeries and injections; kidney disease s/p renal transplant. She is followed with the transplant team in [facility].     Current diabetic medications include insulin Humalog via insulin pump [employer]. She has 670G but doesn't use sensor because of the cost. She prefers Humalog, feels that it works better.  She reported having infection at the site     Monitoring  - Insulin pump downloaded and reviewed the data. She is not using the sensor, uses manual bolus. She calculated carbs and does her ratio manually   Hypoglycemia: occasional    Hypothyroidism is managed with levothyroxine 112 mcg daily.       MEDICATIONS    Current Outpatient Medications:   •  COMBIGAN 0.2-0.5 % ophthalmic solution, , Disp: , Rfl:   •  HumaLOG 100 UNIT/ML injection, INJECT 80 UNITS DAILY VIA INSULIN PUMP, Disp: 90 mL, Rfl: 3  •  levothyroxine (Synthroid) 112 MCG tablet, Take 1 tablet by mouth Daily., Disp: 90 tablet, Rfl: 3  •  LO LOESTRIN FE 1 MG-10 MCG / 10 MCG tablet, , Disp: , Rfl:   •  losartan (COZAAR) 25 MG tablet, , Disp: , Rfl:   •  methocarbamol (ROBAXIN) 500 MG tablet, , Disp: , Rfl:   •  mycophenolate (CELLCEPT) 500 MG tablet, , Disp: , Rfl:   •  omeprazole (priLOSEC) 40 MG capsule, Take 40 mg by mouth Daily., Disp: , Rfl:   •  tacrolimus (PROGRAF) 0.5 MG capsule, , Disp: , Rfl:   •  tacrolimus (PROGRAF) 1 MG capsule, , Disp: , Rfl:   •  TRUEPLUS INSULIN SYRINGE 31G X 5/16\" 0.3 ML misc, Use three times daily with Insulin distribution, Disp: 100 each, Rfl: 5  •  RHOPRESSA 0.02 % solution, , Disp: , Rfl: " "    Review of Systems  Review of Systems   Constitutional: Positive for fatigue.   Eyes: Positive for redness and visual disturbance.   Genitourinary: Negative for flank pain, menstrual problem and pelvic pressure.   Skin: Positive for rash.        Hair loss   Neurological: Positive for dizziness and headache.   Psychiatric/Behavioral: Positive for sleep disturbance.   All other systems reviewed and are negative.        Objective:      /76   Pulse 83   Ht 162.6 cm (64\")   Wt 85.5 kg (188 lb 8 oz)   SpO2 99%   BMI 32.36 kg/m² Body mass index is 32.36 kg/m².  Physical Exam   Constitutional: She is oriented to person, place, and time. She appears well-developed and well-nourished.   HENT:   Head: Normocephalic and atraumatic.   Cardiovascular: Normal rate, regular rhythm, normal heart sounds and normal pulses.   Pulmonary/Chest: Effort normal and breath sounds normal.   Musculoskeletal: No swelling.   Neurological: She is alert and oriented to person, place, and time.   Psychiatric: Thought content normal.           LABS AND IMAGING    Labs:   Lab Results   Component Value Date    HGBA1C 6.5 10/22/2021    HGBA1C 5.5 02/24/2021    HGBA1C 6.0 11/04/2020     Office Visit on 10/22/2021   Component Date Value Ref Range Status   • Hemoglobin A1C 10/22/2021 6.5  % Final   • Lot Number 10/22/2021 [id number]   Final   • Expiration Date 10/22/2021 05/19/2023   Final   • Glucose 10/22/2021 73  70 - 130 mg/dL Final   • Lot Number 10/22/2021 [id number]   Final   • Expiration Date 10/22/2021 04/14/2022   Final         Assessment:         Diagnoses and all orders for this visit:    Type 1 diabetes mellitus with hyperglycemia (HCC)  -     POC Glycosylated Hemoglobin (Hb A1C)  -     POC Glucose, Blood  -     Lipid Panel; Future  -     Comprehensive Metabolic Panel; Future    Acquired hypothyroidism  -     TSH; Future  -     T4, Free; Future    Other orders  -     Discontinue: methocarbamol (ROBAXIN) 500 MG tablet; Take 500 mg " by mouth 3 (Three) Times a Day.  -     HumaLOG 100 UNIT/ML injection; INJECT 80 UNITS DAILY VIA INSULIN PUMP  -     levothyroxine (Synthroid) 112 MCG tablet; Take 1 tablet by mouth Daily.        Plan:      -Insulin pump downloaded and data reviewed. Less hypoglycemia noted.  -A1C is at goal  -Humalog rx sent and Meds refilled.     Check thyroid function test - cont Levothyroxine 112 mcg daily     Sees eye specialist every 4 weeks. Transplant team is once a year.   Nephrology visit every 4-6 months.    Follow-up in 3 months

## 2021-11-24 ENCOUNTER — TELEPHONE (OUTPATIENT)
Dept: ENDOCRINOLOGY | Facility: CLINIC | Age: 41
End: 2021-11-24

## 2021-11-24 NOTE — TELEPHONE ENCOUNTER
Patient would like a call back concerning her lab work.  She would like to discuss the side effects.

## 2021-11-24 NOTE — TELEPHONE ENCOUNTER
"I have reviewed the labs and thyroid function and metabolic panel are normal. Lipid panel showed borderline elevated \"bad\" cholesterol. Your symptoms could not be explained by the thyroid since the levels are normal.   "

## 2021-11-24 NOTE — TELEPHONE ENCOUNTER
Pt states she knows this is her thyroid because she had these symptoms when she was first diagnosed. Asking if her medication could be ajusted to see if it would help?

## 2021-11-25 NOTE — TELEPHONE ENCOUNTER
There is no indication to change the dose, but we can try changing to the brand medication - synthroid. Sometimes it improves the symptoms. We can offer her a 4 weeks trial of samples(if we have 112 synthroid or Unithroid available).

## 2022-01-25 ENCOUNTER — TELEPHONE (OUTPATIENT)
Dept: ENDOCRINOLOGY | Facility: CLINIC | Age: 42
End: 2022-01-25

## 2022-01-25 NOTE — TELEPHONE ENCOUNTER
Prednisone always affects the glucose. We can review and adjust her insulin pump settings to address the hyperglycemia. I can offer her appointment this ore next week if it helps. I can see her Jan 26 at 10 am , Feb 1  10 am or 12 pm

## 2022-01-25 NOTE — TELEPHONE ENCOUNTER
Patient called having hives, saw Dermatologist who treated her and prescribed Predisone 10 mg X 3 times day.  This medication raised her sugar to 460 ml.  They also prescribed antihistamines (Benadryl, Zyrtec, etc .) Patient is wondering what else she could take that would not affect her sugar levels? Please call at best number [phone].  Thank you

## 2022-03-01 NOTE — TELEPHONE ENCOUNTER
[facility] CALLED STATING HUMALOG IS NOT COVERED AND ADMELOG VIALS ARE COVERED. PLEASE SEND IN RX TO [facility]. THANK YOU

## 2022-04-26 ENCOUNTER — OFFICE VISIT (OUTPATIENT)
Dept: ENDOCRINOLOGY | Facility: CLINIC | Age: 42
End: 2022-04-26

## 2022-04-26 VITALS
WEIGHT: 190.6 LBS | DIASTOLIC BLOOD PRESSURE: 78 MMHG | HEART RATE: 86 BPM | OXYGEN SATURATION: 98 % | BODY MASS INDEX: 32.54 KG/M2 | HEIGHT: 64 IN | SYSTOLIC BLOOD PRESSURE: 122 MMHG

## 2022-04-26 DIAGNOSIS — E03.9 ACQUIRED HYPOTHYROIDISM: ICD-10-CM

## 2022-04-26 DIAGNOSIS — E10.65 TYPE 1 DIABETES MELLITUS WITH HYPERGLYCEMIA: ICD-10-CM

## 2022-04-26 DIAGNOSIS — Z46.81 INSULIN PUMP TITRATION: Primary | ICD-10-CM

## 2022-04-26 LAB
EXPIRATION DATE: NORMAL
EXPIRATION DATE: NORMAL
GLUCOSE BLDC GLUCOMTR-MCNC: 78 MG/DL (ref 70–130)
HBA1C MFR BLD: 6.3 %
Lab: NORMAL
Lab: NORMAL

## 2022-04-26 PROCEDURE — 99214 OFFICE O/P EST MOD 30 MIN: CPT | Performed by: INTERNAL MEDICINE

## 2022-04-26 PROCEDURE — 82947 ASSAY GLUCOSE BLOOD QUANT: CPT | Performed by: INTERNAL MEDICINE

## 2022-04-26 PROCEDURE — 83036 HEMOGLOBIN GLYCOSYLATED A1C: CPT | Performed by: INTERNAL MEDICINE

## 2022-04-26 RX ORDER — NORETHINDRONE ACETATE AND ETHINYL ESTRADIOL, AND FERROUS FUMARATE 1MG-20(24)
KIT ORAL
COMMUNITY
Start: 2022-04-10 | End: 2022-08-18 | Stop reason: DRUGHIGH

## 2022-04-26 NOTE — PROGRESS NOTES
"Subjective:     Chief Complaint   Patient presents with   • Diabetes   • Hypothyroidism     Follow UP      Ellie Meyer is a 41 y.o. female who is is being seen for follow-up  Type 1 diabetes mellitus.    The initial diagnosis of diabetes was made in childhood and she had variably glycemic control with multiple complications.  Diabetic complications: nephropathy, retinopathy s/p surgeries vitrectomy and multiple laser surgeries and injections; kidney disease s/p renal transplant. She is followed with the transplant team in [facility].     Current diabetic medications include insulin Humalog via insulin pump [employer]. She has 670G but doesn't use sensor because of the cost. She prefers Humalog, feels that it works better.      Monitoring  - Insulin pump downloaded and reviewed the data. She is not using the sensor, uses manual bolus. She calculated carbs and does her ratio manually   Hypoglycemia: occasional    Hypothyroidism is managed with levothyroxine 112 mcg daily.     Patient had recent labs with her transplant team.     MEDICATIONS    Current Outpatient Medications:   •  Admelog 100 UNIT/ML injection, 80 Units via Insulin Pump daily, Disp: 30 mL, Rfl: 3  •  COMBIGAN 0.2-0.5 % ophthalmic solution, , Disp: , Rfl:   •  Sulma 24 Fe 1-20 MG-MCG(24) per tablet, , Disp: , Rfl:   •  levothyroxine (Synthroid) 112 MCG tablet, Take 1 tablet by mouth Daily., Disp: 90 tablet, Rfl: 3  •  losartan (COZAAR) 25 MG tablet, , Disp: , Rfl:   •  methocarbamol (ROBAXIN) 500 MG tablet, , Disp: , Rfl:   •  mycophenolate (CELLCEPT) 500 MG tablet, , Disp: , Rfl:   •  omeprazole (priLOSEC) 40 MG capsule, Take 40 mg by mouth Daily., Disp: , Rfl:   •  RHOPRESSA 0.02 % solution, , Disp: , Rfl:   •  tacrolimus (PROGRAF) 0.5 MG capsule, , Disp: , Rfl:   •  tacrolimus (PROGRAF) 1 MG capsule, , Disp: , Rfl:   •  TRUEPLUS INSULIN SYRINGE 31G X 5/16\" 0.3 ML misc, Use three times daily with Insulin distribution, Disp: 100 each, Rfl: " "5    Review of Systems  Review of Systems   Constitutional: Positive for fatigue.   Eyes: Positive for redness and visual disturbance.   Genitourinary: Negative for flank pain, menstrual problem and pelvic pressure.   Skin: Positive for rash.        Hair loss   Neurological: Positive for dizziness and headache.   Psychiatric/Behavioral: Positive for sleep disturbance.   All other systems reviewed and are negative.        Objective:      /78   Pulse 86   Ht 162.6 cm (64\")   Wt 86.5 kg (190 lb 9.6 oz)   SpO2 98%   BMI 32.72 kg/m² Body mass index is 32.72 kg/m².  Physical Exam   Constitutional: She is oriented to person, place, and time. She appears well-developed and well-nourished.   HENT:   Head: Normocephalic and atraumatic.   Cardiovascular: Normal rate, regular rhythm, normal heart sounds and normal pulses.   Pulmonary/Chest: Effort normal and breath sounds normal.   Musculoskeletal: No swelling.   Neurological: She is alert and oriented to person, place, and time.   Psychiatric: Thought content normal.           LABS AND IMAGING    Labs:   Lab Results   Component Value Date    HGBA1C 6.3 04/26/2022    HGBA1C 6.5 10/22/2021    HGBA1C 5.5 02/24/2021     Office Visit on 04/26/2022   Component Date Value Ref Range Status   • Hemoglobin A1C 04/26/2022 6.3  % Final   • Lot Number 04/26/2022 [id number]   Final   • Expiration Date 04/26/2022 01/07/2024   Final   • Glucose 04/26/2022 78  70 - 130 mg/dL Final   • Lot Number 04/26/2022 [id number]   Final   • Expiration Date 04/26/2022 06/11/2022   Final         Assessment:         Diagnoses and all orders for this visit:    Insulin pump titration  -     POC Glycosylated Hemoglobin (Hb A1C)  -     POC Glucose, Blood    Type 1 diabetes mellitus with hyperglycemia (HCC)  -     POC Glycosylated Hemoglobin (Hb A1C)  -     POC Glucose, Blood    Other orders  -     Sulma 24 Fe 1-20 MG-MCG(24) per tablet        Plan:      -Insulin pump downloaded and data reviewed. Less " hypoglycemia noted.  -A1C is at goal.   -Humalog rx sent and Meds refilled.     Check thyroid function test - cont Levothyroxine 112 mcg daily     Sees eye specialist every 4 weeks. Transplant team is once a year.   Nephrology visit every 4-6 months.    Follow-up in 3 months

## 2022-06-15 DIAGNOSIS — E03.9 ACQUIRED HYPOTHYROIDISM: ICD-10-CM

## 2022-06-21 DIAGNOSIS — E03.9 ACQUIRED HYPOTHYROIDISM: Primary | ICD-10-CM

## 2022-07-15 ENCOUNTER — TELEPHONE (OUTPATIENT)
Dept: ENDOCRINOLOGY | Facility: CLINIC | Age: 42
End: 2022-07-15

## 2022-07-15 NOTE — TELEPHONE ENCOUNTER
"Pt notified of results stated the Creatine is exactly where it is suppose to be according to the \"[facility]\"   "

## 2022-07-15 NOTE — TELEPHONE ENCOUNTER
----- Message from Shruthi CHAMPION MD sent at 7/14/2022  7:40 PM EDT -----      ----- Message -----  From: Sully España  Sent: 7/14/2022  10:12 AM EDT  To: Shruthi CHAMPION MD

## 2022-08-18 ENCOUNTER — OFFICE VISIT (OUTPATIENT)
Dept: ENDOCRINOLOGY | Facility: CLINIC | Age: 42
End: 2022-08-18

## 2022-08-18 VITALS
OXYGEN SATURATION: 98 % | BODY MASS INDEX: 33.14 KG/M2 | WEIGHT: 194.1 LBS | SYSTOLIC BLOOD PRESSURE: 110 MMHG | HEART RATE: 78 BPM | DIASTOLIC BLOOD PRESSURE: 78 MMHG | HEIGHT: 64 IN

## 2022-08-18 DIAGNOSIS — E10.65 TYPE 1 DIABETES MELLITUS WITH HYPERGLYCEMIA: ICD-10-CM

## 2022-08-18 DIAGNOSIS — E03.9 ACQUIRED HYPOTHYROIDISM: ICD-10-CM

## 2022-08-18 DIAGNOSIS — Z46.81 INSULIN PUMP TITRATION: Primary | ICD-10-CM

## 2022-08-18 PROBLEM — IMO0002 DIABETES MELLITUS TYPE 1, UNCONTROLLED, WITH COMPLICATIONS: Status: RESOLVED | Noted: 2020-03-03 | Resolved: 2022-08-18

## 2022-08-18 LAB
EXPIRATION DATE: NORMAL
EXPIRATION DATE: NORMAL
GLUCOSE BLDC GLUCOMTR-MCNC: 77 MG/DL (ref 70–130)
HBA1C MFR BLD: 6 %
Lab: NORMAL
Lab: NORMAL

## 2022-08-18 PROCEDURE — 82947 ASSAY GLUCOSE BLOOD QUANT: CPT | Performed by: INTERNAL MEDICINE

## 2022-08-18 PROCEDURE — 99214 OFFICE O/P EST MOD 30 MIN: CPT | Performed by: INTERNAL MEDICINE

## 2022-08-18 PROCEDURE — 83036 HEMOGLOBIN GLYCOSYLATED A1C: CPT | Performed by: INTERNAL MEDICINE

## 2022-08-18 RX ORDER — OMEPRAZOLE 20 MG/1
CAPSULE, DELAYED RELEASE ORAL
COMMUNITY
Start: 2022-08-01 | End: 2022-12-02 | Stop reason: ALTCHOICE

## 2022-08-18 RX ORDER — DROSPIRENONE 4 MG/1
TABLET, FILM COATED ORAL
COMMUNITY
Start: 2022-08-01

## 2022-08-18 RX ORDER — LEVOTHYROXINE SODIUM 112 UG/1
112 TABLET ORAL DAILY
Qty: 90 TABLET | Refills: 3 | Status: SHIPPED | OUTPATIENT
Start: 2022-08-18

## 2022-08-18 RX ORDER — INSULIN LISPRO 100 [IU]/ML
INJECTION, SOLUTION INTRAVENOUS; SUBCUTANEOUS
Qty: 30 ML | Refills: 3 | Status: SHIPPED | OUTPATIENT
Start: 2022-08-18 | End: 2022-12-02 | Stop reason: SDUPTHER

## 2022-08-18 NOTE — PROGRESS NOTES
"Subjective:     Chief Complaint   Patient presents with   • Diabetes     Follow UP: Struggling with weight gain. OB/GYN changed hormone RX       Ellie Meyer is a 42 y.o. female who is is being seen for follow-up  Type 1 diabetes mellitus.    The initial diagnosis of diabetes was made in childhood and she had variably glycemic control with multiple complications.  Diabetic complications: nephropathy, retinopathy s/p surgeries vitrectomy and multiple laser surgeries and injections; kidney disease s/p renal transplant. She is followed with the transplant team in [facility].     Current diabetic medications include insulin Humalog via insulin pump [email or web address]. She has 670G but doesn't use sensor because of the cost.     Monitoring  - Insulin pump downloaded and reviewed the data. She is not using the sensor, uses manual bolus.she injects has a trend towards hypoglycemia  in the morning.     Hypothyroidism is managed with levothyroxine 112 mcg daily.       MEDICATIONS    Current Outpatient Medications:   •  COMBIGAN 0.2-0.5 % ophthalmic solution, , Disp: , Rfl:   •  Drospirenone (Slynd) 4 MG tablet, , Disp: , Rfl:   •  insulin lispro (Admelog) 100 UNIT/ML injection, 80 Units via Insulin Pump daily, Disp: 30 mL, Rfl: 3  •  levothyroxine (Synthroid) 112 MCG tablet, Take 1 tablet by mouth Daily., Disp: 90 tablet, Rfl: 3  •  losartan (COZAAR) 25 MG tablet, , Disp: , Rfl:   •  methocarbamol (ROBAXIN) 500 MG tablet, , Disp: , Rfl:   •  mycophenolate (CELLCEPT) 500 MG tablet, , Disp: , Rfl:   •  omeprazole (priLOSEC) 20 MG capsule, , Disp: , Rfl:   •  RHOPRESSA 0.02 % solution, , Disp: , Rfl:   •  tacrolimus (PROGRAF) 0.5 MG capsule, , Disp: , Rfl:   •  tacrolimus (PROGRAF) 1 MG capsule, , Disp: , Rfl:   •  TRUEPLUS INSULIN SYRINGE 31G X 5/16\" 0.3 ML misc, Use three times daily with Insulin distribution, Disp: 100 each, Rfl: 5    Review of Systems  Review of Systems   Constitutional: Positive for fatigue and unexpected " "weight gain.   Eyes: Positive for redness and visual disturbance.   Genitourinary: Negative for flank pain, menstrual problem and pelvic pressure.   Skin: Positive for rash.        Hair loss   Neurological: Positive for dizziness and headache.   Psychiatric/Behavioral: Positive for sleep disturbance.   All other systems reviewed and are negative.        Objective:      /78   Pulse 78   Ht 162.6 cm (64\")   Wt 88 kg (194 lb 1.6 oz)   SpO2 98%   BMI 33.32 kg/m² Body mass index is 33.32 kg/m².  Physical Exam   Constitutional: She is oriented to person, place, and time. She appears well-developed and well-nourished.   HENT:   Head: Normocephalic and atraumatic.   Cardiovascular: Normal rate, regular rhythm, normal heart sounds and normal pulses.   Pulmonary/Chest: Effort normal and breath sounds normal.   Musculoskeletal: No swelling.   Neurological: She is alert and oriented to person, place, and time.   Psychiatric: Thought content normal.           LABS AND IMAGING    Labs:   Lab Results   Component Value Date    HGBA1C 6.0 08/18/2022    HGBA1C 6.3 04/26/2022    HGBA1C 6.5 10/22/2021     Office Visit on 08/18/2022   Component Date Value Ref Range Status   • Hemoglobin A1C 08/18/2022 6.0  % Final   • Lot Number 08/18/2022 [id number]   Final   • Expiration Date 08/18/2022 04/03/2024   Final   • Glucose 08/18/2022 77  70 - 130 mg/dL Final   • Lot Number 08/18/2022 [id number]   Final   • Expiration Date 08/18/2022 02/26/2023   Final         Assessment:         Diagnoses and all orders for this visit:    Insulin pump titration  -     POC Glycosylated Hemoglobin (Hb A1C)  -     POC Glucose, Blood  -     insulin lispro (Admelog) 100 UNIT/ML injection; 80 Units via Insulin Pump daily    Type 1 diabetes mellitus with hyperglycemia (HCC)  -     POC Glycosylated Hemoglobin (Hb A1C)  -     POC Glucose, Blood  -     Lipid Panel; Future    Acquired hypothyroidism  -     TSH; Future  -     T4, Free; Future    Other " orders  -     Drospirenone (Slynd) 4 MG tablet  -     omeprazole (priLOSEC) 20 MG capsule  -     levothyroxine (Synthroid) 112 MCG tablet; Take 1 tablet by mouth Daily.        Plan:      -Insulin pump downloaded and data reviewed.  Adjustments -A1C is at goal.   -Humalog rx sent and Meds refilled.     Check thyroid function test - cont Levothyroxine 112 mcg daily. I have given patient orders for labs to be done with next lab draw.     Sees eye specialist every 4 weeks. Transplant team is once a year.   Nephrology visit every 4-6 months.    Follow-up in 3 months

## 2022-09-20 DIAGNOSIS — Z46.81 INSULIN PUMP TITRATION: ICD-10-CM

## 2022-09-20 NOTE — TELEPHONE ENCOUNTER
NEEDS 3 MONTH SUPPLY PRESCRIPTION SENT TO PHARMACY. WE SENT IN LAST TIME FOR 1 MONTH SHE NEEDS 3 MONTH SUPPLY.

## 2022-09-21 RX ORDER — INSULIN LISPRO 100 [IU]/ML
INJECTION, SOLUTION INTRAVENOUS; SUBCUTANEOUS
Qty: 30 ML | Refills: 10 | Status: SHIPPED | OUTPATIENT
Start: 2022-09-21 | End: 2022-12-02 | Stop reason: SDUPTHER

## 2022-12-02 ENCOUNTER — OFFICE VISIT (OUTPATIENT)
Dept: ENDOCRINOLOGY | Facility: CLINIC | Age: 42
End: 2022-12-02

## 2022-12-02 VITALS
SYSTOLIC BLOOD PRESSURE: 130 MMHG | HEIGHT: 64 IN | BODY MASS INDEX: 33.34 KG/M2 | WEIGHT: 195.3 LBS | DIASTOLIC BLOOD PRESSURE: 72 MMHG | HEART RATE: 89 BPM | OXYGEN SATURATION: 99 %

## 2022-12-02 DIAGNOSIS — E03.9 ACQUIRED HYPOTHYROIDISM: ICD-10-CM

## 2022-12-02 DIAGNOSIS — Z46.81 INSULIN PUMP TITRATION: ICD-10-CM

## 2022-12-02 DIAGNOSIS — E10.65 TYPE 1 DIABETES MELLITUS WITH HYPERGLYCEMIA: Primary | ICD-10-CM

## 2022-12-02 PROCEDURE — 99214 OFFICE O/P EST MOD 30 MIN: CPT | Performed by: INTERNAL MEDICINE

## 2022-12-02 RX ORDER — PANTOPRAZOLE SODIUM 40 MG/1
TABLET, DELAYED RELEASE ORAL
COMMUNITY
Start: 2022-11-29

## 2022-12-02 RX ORDER — INSULIN LISPRO 100 [IU]/ML
INJECTION, SOLUTION INTRAVENOUS; SUBCUTANEOUS
Qty: 90 ML | Refills: 3 | Status: SHIPPED | OUTPATIENT
Start: 2022-12-02

## 2023-02-23 DIAGNOSIS — E03.9 ACQUIRED HYPOTHYROIDISM: ICD-10-CM

## 2023-02-23 DIAGNOSIS — E10.65 TYPE 1 DIABETES MELLITUS WITH HYPERGLYCEMIA: ICD-10-CM

## 2023-02-23 DIAGNOSIS — Z46.81 INSULIN PUMP TITRATION: ICD-10-CM

## 2023-03-15 ENCOUNTER — OFFICE VISIT (OUTPATIENT)
Dept: ENDOCRINOLOGY | Facility: CLINIC | Age: 43
End: 2023-03-15
Payer: COMMERCIAL

## 2023-03-15 VITALS
HEART RATE: 83 BPM | HEIGHT: 64 IN | OXYGEN SATURATION: 98 % | WEIGHT: 198 LBS | DIASTOLIC BLOOD PRESSURE: 76 MMHG | BODY MASS INDEX: 33.8 KG/M2 | SYSTOLIC BLOOD PRESSURE: 120 MMHG

## 2023-03-15 DIAGNOSIS — E10.65 TYPE 1 DIABETES MELLITUS WITH HYPERGLYCEMIA: Primary | ICD-10-CM

## 2023-03-15 DIAGNOSIS — E03.9 ACQUIRED HYPOTHYROIDISM: ICD-10-CM

## 2023-03-15 DIAGNOSIS — Z46.81 INSULIN PUMP TITRATION: ICD-10-CM

## 2023-03-15 LAB
EXPIRATION DATE: NORMAL
GLUCOSE BLDC GLUCOMTR-MCNC: 197 MG/DL (ref 70–130)
HBA1C MFR BLD: 5.7 %
Lab: NORMAL

## 2023-03-15 PROCEDURE — 83036 HEMOGLOBIN GLYCOSYLATED A1C: CPT | Performed by: INTERNAL MEDICINE

## 2023-03-15 PROCEDURE — 99214 OFFICE O/P EST MOD 30 MIN: CPT | Performed by: INTERNAL MEDICINE

## 2023-03-15 PROCEDURE — 82947 ASSAY GLUCOSE BLOOD QUANT: CPT | Performed by: INTERNAL MEDICINE

## 2023-03-15 RX ORDER — SYRING-NEEDL,DISP,INSUL,0.3 ML 31 GX5/16"
SYRINGE, EMPTY DISPOSABLE MISCELLANEOUS
Qty: 100 EACH | Refills: 5 | Status: SHIPPED | OUTPATIENT
Start: 2023-03-15

## 2023-03-15 NOTE — PROGRESS NOTES
Subjective:     Chief Complaint   Patient presents with   • Diabetes     Follow up       Ellie Meyer is a 42 y.o. female who is is being seen for follow-up  Type 1 diabetes mellitus.    The initial diagnosis of diabetes was made in childhood and she had variable glycemic control with multiple complications.  Diabetic complications: nephropathy, retinopathy s/p surgeries vitrectomy and multiple laser surgeries and injections; kidney disease s/p renal transplant. She is followed with the transplant team in [facility].     Current diabetic medications include insulin Humalog via insulin pump [employer]. She has 670G but doesn't use sensor because of the cost. Her insulin is changed to generic Lispro and she noticed that the the glucose is higher. She tests 10-12 times per day.     Monitoring  - Insulin pump downloaded and reviewed the data. She is not using the sensor, uses manual bolus.she injects has a trend towards hypoglycemia  in the morning.     Hypothyroidism is managed with levothyroxine 112 mcg daily. She is taking a brand name.     C/o fatigue, weight gain, hair loss and she thinks that the levels are low. Recent labs showed optimal TFT with TSH 1.2. She has hx of PCOS. She was on progesterone and stopped taking it a week ago.     MEDICATIONS    Current Outpatient Medications:   •  COMBIGAN 0.2-0.5 % ophthalmic solution, , Disp: , Rfl:   •  Drospirenone (Slynd) 4 MG tablet, , Disp: , Rfl:   •  insulin lispro (Admelog) 100 UNIT/ML injection, 95 Units via Insulin Pump daily, Disp: 90 mL, Rfl: 3  •  levothyroxine (Synthroid) 112 MCG tablet, Take 1 tablet by mouth Daily., Disp: 90 tablet, Rfl: 3  •  losartan (COZAAR) 25 MG tablet, , Disp: , Rfl:   •  methocarbamol (ROBAXIN) 500 MG tablet, , Disp: , Rfl:   •  mycophenolate (CELLCEPT) 500 MG tablet, , Disp: , Rfl:   •  pantoprazole (PROTONIX) 40 MG EC tablet, , Disp: , Rfl:   •  RHOPRESSA 0.02 % solution, , Disp: , Rfl:   •  tacrolimus (PROGRAF) 0.5 MG capsule,  ", Disp: , Rfl:   •  tacrolimus (PROGRAF) 1 MG capsule, , Disp: , Rfl:   •  TRUEplus Insulin Syringe 31G X 5/16\" 0.3 ML misc, Use three times daily with Insulin distribution, Disp: 100 each, Rfl: 5    Review of Systems  Review of Systems   Constitutional: Positive for fatigue and unexpected weight gain.   Eyes: Positive for redness and visual disturbance.   Cardiovascular: Positive for leg swelling.   Skin:        Hair loss   Neurological: Positive for dizziness and headache.   Psychiatric/Behavioral: Positive for sleep disturbance.   All other systems reviewed and are negative.        Objective:      /76   Pulse 83   Ht 162.6 cm (64\")   Wt 89.8 kg (198 lb)   SpO2 98%   BMI 33.99 kg/m² Body mass index is 33.99 kg/m².  Physical Exam   Constitutional: She is oriented to person, place, and time. She appears well-developed and well-nourished.   HENT:   Head: Normocephalic and atraumatic.   Cardiovascular: Normal rate, regular rhythm, normal heart sounds and normal pulses.   Pulmonary/Chest: Effort normal and breath sounds normal.   Neurological: She is alert and oriented to person, place, and time.   Psychiatric: Thought content normal.           LABS AND IMAGING    Labs:   Lab Results   Component Value Date    HGBA1C 5.7 03/15/2023    HGBA1C 6.0 08/18/2022    HGBA1C 6.3 04/26/2022     Office Visit on 03/15/2023   Component Date Value Ref Range Status   • Hemoglobin A1C 03/15/2023 5.7  % Final   • Lot Number 03/15/2023 [id number]   Final   • Expiration Date 03/15/2023 10/17/24   Final   • Glucose 03/15/2023 197 (A)  70 - 130 mg/dL Final         Assessment:         Diagnoses and all orders for this visit:    Type 1 diabetes mellitus with hyperglycemia (HCC)  -     POC Glycosylated Hemoglobin (Hb A1C)  -     POC Glucose, Blood    Acquired hypothyroidism    Insulin pump titration    Other orders  -     TRUEplus Insulin Syringe 31G X 5/16\" 0.3 ML misc; Use three times daily with Insulin distribution        Plan: "      -Insulin pump downloaded and data reviewed.    -Humalog rx resent and Meds refilled.  Continue same settings.      - cont Levothyroxine 112 mcg daily.recent thyroid levels showed normal results.   Sees eye specialist every 4 weeks. Transplant team is once a year.   Nephrology visit every 4-6 months, kidney function was normal.     Recent labs reviewed. TSH is 1.2, optimal range. It is unlikely thyroid related weight gain.    I have advised to continue holding progesterone and follow-up with the GYN.    Follow-up in 3 months

## 2023-07-11 PROBLEM — Z94.0 KIDNEY TRANSPLANT RECIPIENT: Status: ACTIVE | Noted: 2022-08-01

## 2023-07-11 PROBLEM — H40.053 BILATERAL OCULAR HYPERTENSION: Status: ACTIVE | Noted: 2018-12-07

## 2023-07-11 PROBLEM — E10.3552: Status: ACTIVE | Noted: 2018-12-07

## 2023-07-11 PROBLEM — H26.493 AFTER CATARACT NOT OBSCURING VISION, BILATERAL: Status: ACTIVE | Noted: 2018-12-07

## 2023-07-11 PROBLEM — R39.89 URETHRAL PAIN: Status: ACTIVE | Noted: 2020-09-21

## 2023-07-11 PROBLEM — M62.89 PFD (PELVIC FLOOR DYSFUNCTION): Status: ACTIVE | Noted: 2020-09-16

## 2023-07-11 PROBLEM — E11.3599 PROLIFERATIVE DIABETIC RETINOPATHY: Status: ACTIVE | Noted: 2018-12-07

## 2023-07-11 PROBLEM — R39.15 URINARY URGENCY: Status: ACTIVE | Noted: 2020-09-21

## 2023-07-11 PROBLEM — N39.41 URGE INCONTINENCE: Status: ACTIVE | Noted: 2020-09-21

## 2023-07-11 PROBLEM — N39.0 RECURRENT UTI: Status: ACTIVE | Noted: 2021-08-26

## 2023-12-01 ENCOUNTER — OFFICE VISIT (OUTPATIENT)
Dept: ENDOCRINOLOGY | Facility: CLINIC | Age: 43
End: 2023-12-01
Payer: COMMERCIAL

## 2023-12-01 VITALS
HEART RATE: 76 BPM | BODY MASS INDEX: 33.24 KG/M2 | OXYGEN SATURATION: 99 % | WEIGHT: 194.7 LBS | SYSTOLIC BLOOD PRESSURE: 128 MMHG | HEIGHT: 64 IN | DIASTOLIC BLOOD PRESSURE: 70 MMHG

## 2023-12-01 DIAGNOSIS — E03.9 ACQUIRED HYPOTHYROIDISM: ICD-10-CM

## 2023-12-01 DIAGNOSIS — Z46.81 INSULIN PUMP TITRATION: ICD-10-CM

## 2023-12-01 DIAGNOSIS — E10.65 TYPE 1 DIABETES MELLITUS WITH HYPERGLYCEMIA: Primary | ICD-10-CM

## 2023-12-01 LAB
EXPIRATION DATE: ABNORMAL
EXPIRATION DATE: NORMAL
GLUCOSE BLDC GLUCOMTR-MCNC: 132 MG/DL (ref 70–130)
HBA1C MFR BLD: 5.7 % (ref 4.5–5.7)
Lab: ABNORMAL
Lab: NORMAL

## 2023-12-01 NOTE — PROGRESS NOTES
"Subjective:     Chief Complaint   Patient presents with    Diabetes      Ellie Meyer is a 43 y.o. female who is is being seen for follow-up  Type 1 diabetes mellitus.    The initial diagnosis of diabetes was made in childhood and she had variable glycemic control with multiple complications.  Diabetic complications: nephropathy, retinopathy s/p surgeries vitrectomy and multiple laser surgeries and injections; kidney disease s/p renal transplant. She is followed with the transplant team in [facility].     Current diabetic medications include insulin Humalog via insulin pump Medtronic 780. She has 670G but doesn't use sensor because of the cost.  She tests 10-12 times per day and keeps glucose in goal.      Monitoring  - Insulin pump downloaded and reviewed the data. She is not using the sensor, uses manual bolus.    Hypothyroidism is managed with levothyroxine 112 mcg daily. She is taking a brand name. TFT were normal 10/13/2023. TSH was 1.43  Labs reviewed.       MEDICATIONS    Current Outpatient Medications:     Drospirenone (Slynd) 4 MG tablet, , Disp: , Rfl:     insulin lispro (Admelog) 100 UNIT/ML injection, 95 Units via Insulin Pump daily, Disp: 90 mL, Rfl: 3    levothyroxine (Synthroid) 112 MCG tablet, Take 1 tablet by mouth Daily., Disp: 90 tablet, Rfl: 3    losartan (COZAAR) 25 MG tablet, , Disp: , Rfl:     mycophenolate (CELLCEPT) 500 MG tablet, , Disp: , Rfl:     tacrolimus (PROGRAF) 1 MG capsule, Take 1 capsule by mouth 2 (Two) Times a Day., Disp: , Rfl:     TRUEplus Insulin Syringe 31G X 5/16\" 0.3 ML misc, Use three times daily with Insulin distribution, Disp: 100 each, Rfl: 5    Review of Systems  Review of Systems   Constitutional:  Positive for fatigue.   Eyes:  Positive for redness and visual disturbance.   Cardiovascular:  Positive for leg swelling.   Skin:         Hair loss   Neurological:  Positive for dizziness and headache.   Psychiatric/Behavioral:  Positive for sleep disturbance.    All " "other systems reviewed and are negative.        Objective:      /70   Pulse 76   Ht 162.6 cm (64\")   Wt 88.3 kg (194 lb 11.2 oz)   SpO2 99%   BMI 33.42 kg/m² Body mass index is 33.42 kg/m².  Physical Exam   Constitutional: She is oriented to person, place, and time. She appears well-developed and well-nourished.   HENT:   Head: Normocephalic and atraumatic.   Cardiovascular: Normal rate, regular rhythm, normal heart sounds and normal pulses.   Pulmonary/Chest: Effort normal and breath sounds normal.   Neurological: She is alert and oriented to person, place, and time.   Psychiatric: Thought content normal.           LABS AND IMAGING    Labs:   Lab Results   Component Value Date    HGBA1C 5.7 12/01/2023    HGBA1C 5.8 07/11/2023    HGBA1C 5.7 03/15/2023     Office Visit on 12/01/2023   Component Date Value Ref Range Status    Glucose 12/01/2023 132 (A)  70 - 130 mg/dL Final    Lot Number 12/01/2023 [id number]   Final    Expiration Date 12/01/2023 5/23/24   Final    Hemoglobin A1C 12/01/2023 5.7  4.5 - 5.7 % Final    Lot Number 12/01/2023 [id number]   Final    Expiration Date 12/01/2023 6/6/25   Final         Assessment:         Diagnoses and all orders for this visit:    Type 1 diabetes mellitus with hyperglycemia  -     POC Glucose, Blood  -     POC Glycosylated Hemoglobin (Hb A1C)    Acquired hypothyroidism    Insulin pump titration          Plan:      -Insulin pump downloaded and data reviewed.   She is not using sensors due to insurance not covering it. Her glucose is in range most of the time, she is using manual boluses.      -cont Levothyroxine 112 mcg daily.recent thyroid levels showed normal results.     Sees eye specialist every 4 weeks. Transplant team- once a year.   Nephrology visit every 4-6 months, kidney function was normal.   Recent labs reviewed. TSH is normal Lipids, CMP and CBC are normal.       Follow-up in 3 months  "

## 2024-01-03 ENCOUNTER — ANESTHESIA EVENT (OUTPATIENT)
Dept: PERIOP | Facility: HOSPITAL | Age: 44
End: 2024-01-03
Payer: COMMERCIAL

## 2024-01-03 ENCOUNTER — PRE-ADMISSION TESTING (OUTPATIENT)
Dept: PREADMISSION TESTING | Facility: HOSPITAL | Age: 44
End: 2024-01-03
Payer: COMMERCIAL

## 2024-01-03 VITALS — HEIGHT: 64 IN | BODY MASS INDEX: 33.12 KG/M2 | WEIGHT: 194 LBS

## 2024-01-03 LAB
25(OH)D3 SERPL-MCNC: 42.7 NG/ML (ref 30–100)
ALBUMIN SERPL-MCNC: 4 G/DL (ref 3.5–5.2)
ALBUMIN/GLOB SERPL: 1.3 G/DL
ALP SERPL-CCNC: 45 U/L (ref 39–117)
ALT SERPL W P-5'-P-CCNC: 16 U/L (ref 1–33)
ANION GAP SERPL CALCULATED.3IONS-SCNC: 11 MMOL/L (ref 5–15)
APTT PPP: 28.5 SECONDS (ref 22–39)
AST SERPL-CCNC: 18 U/L (ref 1–32)
BASOPHILS # BLD AUTO: 0.05 10*3/MM3 (ref 0–0.2)
BASOPHILS NFR BLD AUTO: 0.4 % (ref 0–1.5)
BILIRUB SERPL-MCNC: 0.7 MG/DL (ref 0–1.2)
BUN SERPL-MCNC: 22 MG/DL (ref 6–20)
BUN/CREAT SERPL: 20.6 (ref 7–25)
CALCIUM SPEC-SCNC: 9.4 MG/DL (ref 8.6–10.5)
CHLORIDE SERPL-SCNC: 106 MMOL/L (ref 98–107)
CO2 SERPL-SCNC: 22 MMOL/L (ref 22–29)
CREAT SERPL-MCNC: 1.07 MG/DL (ref 0.57–1)
CRP SERPL-MCNC: 1.26 MG/DL (ref 0–0.5)
DEPRECATED RDW RBC AUTO: 42.4 FL (ref 37–54)
EGFRCR SERPLBLD CKD-EPI 2021: 65.8 ML/MIN/1.73
EOSINOPHIL # BLD AUTO: 0.17 10*3/MM3 (ref 0–0.4)
EOSINOPHIL NFR BLD AUTO: 1.4 % (ref 0.3–6.2)
ERYTHROCYTE [DISTWIDTH] IN BLOOD BY AUTOMATED COUNT: 12.9 % (ref 12.3–15.4)
ERYTHROCYTE [SEDIMENTATION RATE] IN BLOOD: 34 MM/HR (ref 0–20)
GLOBULIN UR ELPH-MCNC: 3.1 GM/DL
GLUCOSE SERPL-MCNC: 161 MG/DL (ref 65–99)
HBA1C MFR BLD: 6.2 % (ref 4.8–5.6)
HCT VFR BLD AUTO: 34.9 % (ref 34–46.6)
HGB BLD-MCNC: 11.3 G/DL (ref 12–15.9)
IMM GRANULOCYTES # BLD AUTO: 0.09 10*3/MM3 (ref 0–0.05)
IMM GRANULOCYTES NFR BLD AUTO: 0.7 % (ref 0–0.5)
INR PPP: 1.03 (ref 0.89–1.12)
LYMPHOCYTES # BLD AUTO: 1.77 10*3/MM3 (ref 0.7–3.1)
LYMPHOCYTES NFR BLD AUTO: 14.2 % (ref 19.6–45.3)
MCH RBC QN AUTO: 29 PG (ref 26.6–33)
MCHC RBC AUTO-ENTMCNC: 32.4 G/DL (ref 31.5–35.7)
MCV RBC AUTO: 89.7 FL (ref 79–97)
MONOCYTES # BLD AUTO: 0.59 10*3/MM3 (ref 0.1–0.9)
MONOCYTES NFR BLD AUTO: 4.7 % (ref 5–12)
MRSA DNA SPEC QL NAA+PROBE: NEGATIVE
NEUTROPHILS NFR BLD AUTO: 78.6 % (ref 42.7–76)
NEUTROPHILS NFR BLD AUTO: 9.79 10*3/MM3 (ref 1.7–7)
NRBC BLD AUTO-RTO: 0 /100 WBC (ref 0–0.2)
PLATELET # BLD AUTO: 249 10*3/MM3 (ref 140–450)
PMV BLD AUTO: 12.9 FL (ref 6–12)
POTASSIUM SERPL-SCNC: 4 MMOL/L (ref 3.5–5.2)
PROT SERPL-MCNC: 7.1 G/DL (ref 6–8.5)
PROTHROMBIN TIME: 13.6 SECONDS (ref 12.2–14.5)
QT INTERVAL: 358 MS
QTC INTERVAL: 418 MS
RBC # BLD AUTO: 3.89 10*6/MM3 (ref 3.77–5.28)
SODIUM SERPL-SCNC: 139 MMOL/L (ref 136–145)
WBC NRBC COR # BLD AUTO: 12.46 10*3/MM3 (ref 3.4–10.8)

## 2024-01-03 PROCEDURE — 93010 ELECTROCARDIOGRAM REPORT: CPT | Performed by: INTERNAL MEDICINE

## 2024-01-03 PROCEDURE — 82306 VITAMIN D 25 HYDROXY: CPT

## 2024-01-03 PROCEDURE — 85610 PROTHROMBIN TIME: CPT

## 2024-01-03 PROCEDURE — 85025 COMPLETE CBC W/AUTO DIFF WBC: CPT

## 2024-01-03 PROCEDURE — 82985 ASSAY OF GLYCATED PROTEIN: CPT

## 2024-01-03 PROCEDURE — 85652 RBC SED RATE AUTOMATED: CPT

## 2024-01-03 PROCEDURE — 80053 COMPREHEN METABOLIC PANEL: CPT

## 2024-01-03 PROCEDURE — 36415 COLL VENOUS BLD VENIPUNCTURE: CPT

## 2024-01-03 PROCEDURE — 86140 C-REACTIVE PROTEIN: CPT

## 2024-01-03 PROCEDURE — G0480 DRUG TEST DEF 1-7 CLASSES: HCPCS

## 2024-01-03 PROCEDURE — 93005 ELECTROCARDIOGRAM TRACING: CPT

## 2024-01-03 PROCEDURE — 87641 MR-STAPH DNA AMP PROBE: CPT

## 2024-01-03 PROCEDURE — 83036 HEMOGLOBIN GLYCOSYLATED A1C: CPT

## 2024-01-03 PROCEDURE — 85730 THROMBOPLASTIN TIME PARTIAL: CPT

## 2024-01-03 RX ORDER — SODIUM CHLORIDE 0.9 % (FLUSH) 0.9 %
10 SYRINGE (ML) INJECTION EVERY 12 HOURS SCHEDULED
Status: CANCELLED | OUTPATIENT
Start: 2024-01-03

## 2024-01-03 RX ORDER — FAMOTIDINE 10 MG/ML
20 INJECTION, SOLUTION INTRAVENOUS ONCE
Status: CANCELLED | OUTPATIENT
Start: 2024-01-03 | End: 2024-01-03

## 2024-01-03 RX ORDER — OMEPRAZOLE 20 MG/1
20 CAPSULE, DELAYED RELEASE ORAL DAILY
COMMUNITY

## 2024-01-03 RX ORDER — METHENAMINE HIPPURATE 1000 MG/1
1 TABLET ORAL 2 TIMES DAILY
COMMUNITY

## 2024-01-03 NOTE — PAT
An arrival time for procedure was not provided during PAT visit. If patient had any questions or concerns about their arrival time, they were instructed to contact their surgeon/physician.  Additionally, if the patient referred to an arrival time that was acquired from their my chart account, patient was encouraged to verify that time with their surgeon/physician. Arrival times are NOT provided in Pre Admission Testing Department.    Patient viewed general PAT education video as instructed in their preoperative information received from their surgeon.  Patient stated the general PAT education video was viewed in its entirety and survey completed.  Copies of PAT general education handouts (Incentive Spirometry, Meds to Beds Program, Patient Belongings, Pre-op skin preparation instructions, Blood Glucose testing, Visitor policy, Surgery FAQ, Code H) distributed to patient if not printed. Education related to the PAT pass and skin preparation for surgery (if applicable) completed in PAT as a reinforcement to PAT education video. Patient instructed to return PAT pass provided today as well as completed skin preparation sheet (if applicable) on the day of procedure.     Additionally if patient had not viewed video yet but intended to view it at home or in our waiting area, then referred them to the handout with QR code/link provided during PAT visit.  Instructed patient to complete survey after viewing the video in its entirety.  Encouraged patient/family to read PAT general education handouts thoroughly and notify PAT staff with any questions or concerns. Patient verbalized understanding of all information and priority content.    Patient denies any current skin issues.     Patient to apply Chlorhexadine wipes  to surgical area (as instructed) the night before procedure and the AM of procedure. Wipes provided.    Patient instructed to drink 20 ounces of Gatorade or Gatorlyte (if diabetic) and it needs to be completed 1  hour (for Main OR patients) or 2 hours (scheduled  section & [facility] patients) before given arrival time for procedure (NO RED Gatorade and NO Gatorade Zero).    Patient verbalized understanding.    Per Anesthesia Request, patient instructed not to take their ACE/ARB medications on the AM of surgery.    Post-Surgery Information Instruction Sheet given to patient during Pre-Admission Testing Visit with verbal instructions to patient to return with PAT PASS on the day of surgery. Additionally, encouraged patient to review the information provided.    Clean catch urinalysis not indicated because patient denied recent urinary frequency, urinary urgency, burning/pain upon urination, or flank pain. No recent UTIs.    PT LATE TO PAT APPOINTMENT; SHORTENED VERSION DONE. PLEASE REVIEW PROFILE AND PAIN IN PREOP DOS.

## 2024-01-04 ENCOUNTER — HOSPITAL ENCOUNTER (OUTPATIENT)
Facility: HOSPITAL | Age: 44
Setting detail: HOSPITAL OUTPATIENT SURGERY
Discharge: HOME | End: 2024-01-04
Attending: ORTHOPAEDIC SURGERY | Admitting: ORTHOPAEDIC SURGERY
Payer: COMMERCIAL

## 2024-01-04 ENCOUNTER — ANESTHESIA (OUTPATIENT)
Dept: PERIOP | Facility: HOSPITAL | Age: 44
End: 2024-01-04
Payer: COMMERCIAL

## 2024-01-04 ENCOUNTER — ANESTHESIA EVENT CONVERTED (OUTPATIENT)
Dept: ANESTHESIOLOGY | Facility: HOSPITAL | Age: 44
End: 2024-01-04
Payer: COMMERCIAL

## 2024-01-04 ENCOUNTER — APPOINTMENT (OUTPATIENT)
Dept: GENERAL RADIOLOGY | Facility: HOSPITAL | Age: 44
End: 2024-01-04
Payer: COMMERCIAL

## 2024-01-04 VITALS
SYSTOLIC BLOOD PRESSURE: 121 MMHG | DIASTOLIC BLOOD PRESSURE: 56 MMHG | BODY MASS INDEX: 33.12 KG/M2 | HEIGHT: 64 IN | RESPIRATION RATE: 20 BRPM | HEART RATE: 105 BPM | WEIGHT: 194 LBS | OXYGEN SATURATION: 99 % | TEMPERATURE: 98.7 F

## 2024-01-04 LAB
B-HCG UR QL: NEGATIVE
EXPIRATION DATE: NORMAL
FRUCTOSAMINE SERPL-SCNC: 276 UMOL/L (ref 0–285)
GLUCOSE BLDC GLUCOMTR-MCNC: 132 MG/DL (ref 70–130)
GLUCOSE BLDC GLUCOMTR-MCNC: 166 MG/DL (ref 70–130)
GLUCOSE BLDC GLUCOMTR-MCNC: 209 MG/DL (ref 70–130)
INTERNAL NEGATIVE CONTROL: NORMAL
INTERNAL POSITIVE CONTROL: NORMAL
Lab: NORMAL

## 2024-01-04 PROCEDURE — 25810000003 LACTATED RINGERS PER 1000 ML: Performed by: ANESTHESIOLOGY

## 2024-01-04 PROCEDURE — 25010000002 ROPIVACAINE HCL-NACL 0.2-0.9 % SOLUTION: Performed by: NURSE ANESTHETIST, CERTIFIED REGISTERED

## 2024-01-04 PROCEDURE — 97116 GAIT TRAINING THERAPY: CPT

## 2024-01-04 PROCEDURE — 27524 TREAT KNEECAP FRACTURE: CPT | Performed by: PHYSICIAN ASSISTANT

## 2024-01-04 PROCEDURE — C1713 ANCHOR/SCREW BN/BN,TIS/BN: HCPCS | Performed by: ORTHOPAEDIC SURGERY

## 2024-01-04 PROCEDURE — C1769 GUIDE WIRE: HCPCS | Performed by: ORTHOPAEDIC SURGERY

## 2024-01-04 PROCEDURE — 25010000002 DROPERIDOL PER 5 MG

## 2024-01-04 PROCEDURE — 81025 URINE PREGNANCY TEST: CPT | Performed by: ANESTHESIOLOGY

## 2024-01-04 PROCEDURE — 25010000002 PHENYLEPHRINE 10 MG/ML SOLUTION: Performed by: NURSE ANESTHETIST, CERTIFIED REGISTERED

## 2024-01-04 PROCEDURE — 25010000002 FENTANYL CITRATE (PF) 50 MCG/ML SOLUTION: Performed by: NURSE ANESTHETIST, CERTIFIED REGISTERED

## 2024-01-04 PROCEDURE — 25010000002 BUPIVACAINE (PF) 0.25 % SOLUTION: Performed by: ANESTHESIOLOGY

## 2024-01-04 PROCEDURE — 76000 FLUOROSCOPY <1 HR PHYS/QHP: CPT

## 2024-01-04 PROCEDURE — 97161 PT EVAL LOW COMPLEX 20 MIN: CPT

## 2024-01-04 PROCEDURE — 25010000002 PROPOFOL 10 MG/ML EMULSION: Performed by: NURSE ANESTHETIST, CERTIFIED REGISTERED

## 2024-01-04 PROCEDURE — 25010000002 CEFAZOLIN PER 500 MG: Performed by: ORTHOPAEDIC SURGERY

## 2024-01-04 PROCEDURE — 25010000002 ONDANSETRON PER 1 MG: Performed by: NURSE ANESTHETIST, CERTIFIED REGISTERED

## 2024-01-04 PROCEDURE — 73560 X-RAY EXAM OF KNEE 1 OR 2: CPT

## 2024-01-04 PROCEDURE — S0260 H&P FOR SURGERY: HCPCS | Performed by: PHYSICIAN ASSISTANT

## 2024-01-04 PROCEDURE — 82948 REAGENT STRIP/BLOOD GLUCOSE: CPT

## 2024-01-04 PROCEDURE — 25010000002 DEXAMETHASONE PER 1 MG: Performed by: NURSE ANESTHETIST, CERTIFIED REGISTERED

## 2024-01-04 DEVICE — IMPLANTABLE DEVICE: Type: IMPLANTABLE DEVICE | Site: PATELLA | Status: FUNCTIONAL

## 2024-01-04 DEVICE — SUT FIBERTAPE POLYBLEND/BR BLU STR/NDL 2MM: Type: IMPLANTABLE DEVICE | Site: PATELLA | Status: FUNCTIONAL

## 2024-01-04 DEVICE — GW 1.35MM: Type: IMPLANTABLE DEVICE | Site: PATELLA | Status: FUNCTIONAL

## 2024-01-04 RX ORDER — SODIUM CHLORIDE 0.9 % (FLUSH) 0.9 %
3 SYRINGE (ML) INJECTION EVERY 12 HOURS SCHEDULED
Status: DISCONTINUED | OUTPATIENT
Start: 2024-01-04 | End: 2024-01-04 | Stop reason: HOSPADM

## 2024-01-04 RX ORDER — LABETALOL HYDROCHLORIDE 5 MG/ML
5 INJECTION, SOLUTION INTRAVENOUS
Status: DISCONTINUED | OUTPATIENT
Start: 2024-01-04 | End: 2024-01-04 | Stop reason: HOSPADM

## 2024-01-04 RX ORDER — MEPERIDINE HYDROCHLORIDE 25 MG/ML
12.5 INJECTION INTRAMUSCULAR; INTRAVENOUS; SUBCUTANEOUS
Status: DISCONTINUED | OUTPATIENT
Start: 2024-01-04 | End: 2024-01-04 | Stop reason: HOSPADM

## 2024-01-04 RX ORDER — SODIUM CHLORIDE, SODIUM LACTATE, POTASSIUM CHLORIDE, CALCIUM CHLORIDE 600; 310; 30; 20 MG/100ML; MG/100ML; MG/100ML; MG/100ML
9 INJECTION, SOLUTION INTRAVENOUS CONTINUOUS
Status: DISCONTINUED | OUTPATIENT
Start: 2024-01-04 | End: 2024-01-04 | Stop reason: HOSPADM

## 2024-01-04 RX ORDER — HYDROMORPHONE HYDROCHLORIDE 1 MG/ML
0.5 INJECTION, SOLUTION INTRAMUSCULAR; INTRAVENOUS; SUBCUTANEOUS
Status: DISCONTINUED | OUTPATIENT
Start: 2024-01-04 | End: 2024-01-04 | Stop reason: HOSPADM

## 2024-01-04 RX ORDER — NALOXONE HCL 0.4 MG/ML
0.4 VIAL (ML) INJECTION AS NEEDED
Status: DISCONTINUED | OUTPATIENT
Start: 2024-01-04 | End: 2024-01-04 | Stop reason: HOSPADM

## 2024-01-04 RX ORDER — SODIUM CHLORIDE 9 MG/ML
40 INJECTION, SOLUTION INTRAVENOUS AS NEEDED
Status: DISCONTINUED | OUTPATIENT
Start: 2024-01-04 | End: 2024-01-04 | Stop reason: HOSPADM

## 2024-01-04 RX ORDER — IPRATROPIUM BROMIDE AND ALBUTEROL SULFATE 2.5; .5 MG/3ML; MG/3ML
3 SOLUTION RESPIRATORY (INHALATION) ONCE AS NEEDED
Status: DISCONTINUED | OUTPATIENT
Start: 2024-01-04 | End: 2024-01-04 | Stop reason: HOSPADM

## 2024-01-04 RX ORDER — PROMETHAZINE HYDROCHLORIDE 25 MG/1
25 SUPPOSITORY RECTAL ONCE AS NEEDED
Status: DISCONTINUED | OUTPATIENT
Start: 2024-01-04 | End: 2024-01-04 | Stop reason: HOSPADM

## 2024-01-04 RX ORDER — EPHEDRINE SULFATE 50 MG/ML
INJECTION INTRAVENOUS AS NEEDED
Status: DISCONTINUED | OUTPATIENT
Start: 2024-01-04 | End: 2024-01-04 | Stop reason: SURG

## 2024-01-04 RX ORDER — LIDOCAINE HYDROCHLORIDE 10 MG/ML
0.5 INJECTION, SOLUTION EPIDURAL; INFILTRATION; INTRACAUDAL; PERINEURAL ONCE AS NEEDED
Status: COMPLETED | OUTPATIENT
Start: 2024-01-04 | End: 2024-01-04

## 2024-01-04 RX ORDER — FENTANYL CITRATE 50 UG/ML
50 INJECTION, SOLUTION INTRAMUSCULAR; INTRAVENOUS
Status: DISCONTINUED | OUTPATIENT
Start: 2024-01-04 | End: 2024-01-04 | Stop reason: HOSPADM

## 2024-01-04 RX ORDER — PROPOFOL 10 MG/ML
VIAL (ML) INTRAVENOUS AS NEEDED
Status: DISCONTINUED | OUTPATIENT
Start: 2024-01-04 | End: 2024-01-04 | Stop reason: SURG

## 2024-01-04 RX ORDER — ONDANSETRON 2 MG/ML
INJECTION INTRAMUSCULAR; INTRAVENOUS AS NEEDED
Status: DISCONTINUED | OUTPATIENT
Start: 2024-01-04 | End: 2024-01-04 | Stop reason: SURG

## 2024-01-04 RX ORDER — SCOLOPAMINE TRANSDERMAL SYSTEM 1 MG/1
PATCH, EXTENDED RELEASE TRANSDERMAL
Status: DISCONTINUED
Start: 2024-01-04 | End: 2024-01-04 | Stop reason: HOSPADM

## 2024-01-04 RX ORDER — MAGNESIUM HYDROXIDE 1200 MG/15ML
LIQUID ORAL AS NEEDED
Status: DISCONTINUED | OUTPATIENT
Start: 2024-01-04 | End: 2024-01-04 | Stop reason: HOSPADM

## 2024-01-04 RX ORDER — SCOLOPAMINE TRANSDERMAL SYSTEM 1 MG/1
1 PATCH, EXTENDED RELEASE TRANSDERMAL
Status: DISCONTINUED | OUTPATIENT
Start: 2024-01-04 | End: 2024-01-04 | Stop reason: HOSPADM

## 2024-01-04 RX ORDER — SODIUM CHLORIDE 0.9 % (FLUSH) 0.9 %
3-10 SYRINGE (ML) INJECTION AS NEEDED
Status: DISCONTINUED | OUTPATIENT
Start: 2024-01-04 | End: 2024-01-04 | Stop reason: HOSPADM

## 2024-01-04 RX ORDER — FENTANYL CITRATE 50 UG/ML
INJECTION, SOLUTION INTRAMUSCULAR; INTRAVENOUS AS NEEDED
Status: DISCONTINUED | OUTPATIENT
Start: 2024-01-04 | End: 2024-01-04 | Stop reason: SURG

## 2024-01-04 RX ORDER — DEXAMETHASONE SODIUM PHOSPHATE 4 MG/ML
INJECTION, SOLUTION INTRA-ARTICULAR; INTRALESIONAL; INTRAMUSCULAR; INTRAVENOUS; SOFT TISSUE AS NEEDED
Status: DISCONTINUED | OUTPATIENT
Start: 2024-01-04 | End: 2024-01-04 | Stop reason: SURG

## 2024-01-04 RX ORDER — TRANEXAMIC ACID 10 MG/ML
1000 INJECTION, SOLUTION INTRAVENOUS ONCE
Status: DISCONTINUED | OUTPATIENT
Start: 2024-01-04 | End: 2024-01-04 | Stop reason: HOSPADM

## 2024-01-04 RX ORDER — MIDAZOLAM HYDROCHLORIDE 1 MG/ML
1 INJECTION INTRAMUSCULAR; INTRAVENOUS
Status: DISCONTINUED | OUTPATIENT
Start: 2024-01-04 | End: 2024-01-04 | Stop reason: HOSPADM

## 2024-01-04 RX ORDER — TRANEXAMIC ACID 10 MG/ML
1000 INJECTION, SOLUTION INTRAVENOUS ONCE
Status: COMPLETED | OUTPATIENT
Start: 2024-01-04 | End: 2024-01-04

## 2024-01-04 RX ORDER — ROPIVACAINE HYDROCHLORIDE 2 MG/ML
INJECTION, SOLUTION EPIDURAL; INFILTRATION; PERINEURAL CONTINUOUS
Status: DISCONTINUED | OUTPATIENT
Start: 2024-01-04 | End: 2024-01-04 | Stop reason: HOSPADM

## 2024-01-04 RX ORDER — PHENYLEPHRINE HYDROCHLORIDE 10 MG/ML
INJECTION INTRAVENOUS AS NEEDED
Status: DISCONTINUED | OUTPATIENT
Start: 2024-01-04 | End: 2024-01-04 | Stop reason: SURG

## 2024-01-04 RX ORDER — FAMOTIDINE 20 MG/1
20 TABLET, FILM COATED ORAL ONCE
Status: COMPLETED | OUTPATIENT
Start: 2024-01-04 | End: 2024-01-04

## 2024-01-04 RX ORDER — BUPIVACAINE HYDROCHLORIDE 2.5 MG/ML
INJECTION, SOLUTION EPIDURAL; INFILTRATION; INTRACAUDAL
Status: DISCONTINUED | OUTPATIENT
Start: 2024-01-04 | End: 2024-01-04 | Stop reason: SURG

## 2024-01-04 RX ORDER — SODIUM CHLORIDE 0.9 % (FLUSH) 0.9 %
10 SYRINGE (ML) INJECTION AS NEEDED
Status: DISCONTINUED | OUTPATIENT
Start: 2024-01-04 | End: 2024-01-04 | Stop reason: HOSPADM

## 2024-01-04 RX ORDER — DROPERIDOL 2.5 MG/ML
0.62 INJECTION, SOLUTION INTRAMUSCULAR; INTRAVENOUS
Status: DISCONTINUED | OUTPATIENT
Start: 2024-01-04 | End: 2024-01-04 | Stop reason: HOSPADM

## 2024-01-04 RX ORDER — PROMETHAZINE HYDROCHLORIDE 25 MG/1
25 TABLET ORAL ONCE AS NEEDED
Status: DISCONTINUED | OUTPATIENT
Start: 2024-01-04 | End: 2024-01-04 | Stop reason: HOSPADM

## 2024-01-04 RX ORDER — ONDANSETRON 2 MG/ML
4 INJECTION INTRAMUSCULAR; INTRAVENOUS ONCE AS NEEDED
Status: DISCONTINUED | OUTPATIENT
Start: 2024-01-04 | End: 2024-01-04 | Stop reason: HOSPADM

## 2024-01-04 RX ORDER — HYDROCODONE BITARTRATE AND ACETAMINOPHEN 5; 325 MG/1; MG/1
1 TABLET ORAL ONCE AS NEEDED
Status: DISCONTINUED | OUTPATIENT
Start: 2024-01-04 | End: 2024-01-04 | Stop reason: HOSPADM

## 2024-01-04 RX ORDER — HYDRALAZINE HYDROCHLORIDE 20 MG/ML
5 INJECTION INTRAMUSCULAR; INTRAVENOUS
Status: DISCONTINUED | OUTPATIENT
Start: 2024-01-04 | End: 2024-01-04 | Stop reason: HOSPADM

## 2024-01-04 RX ORDER — LIDOCAINE HYDROCHLORIDE 10 MG/ML
INJECTION, SOLUTION EPIDURAL; INFILTRATION; INTRACAUDAL; PERINEURAL AS NEEDED
Status: DISCONTINUED | OUTPATIENT
Start: 2024-01-04 | End: 2024-01-04 | Stop reason: SURG

## 2024-01-04 RX ORDER — DROPERIDOL 2.5 MG/ML
INJECTION, SOLUTION INTRAMUSCULAR; INTRAVENOUS
Status: COMPLETED
Start: 2024-01-04 | End: 2024-01-04

## 2024-01-04 RX ORDER — DROPERIDOL 2.5 MG/ML
0.62 INJECTION, SOLUTION INTRAMUSCULAR; INTRAVENOUS ONCE AS NEEDED
Status: DISCONTINUED | OUTPATIENT
Start: 2024-01-04 | End: 2024-01-04 | Stop reason: HOSPADM

## 2024-01-04 RX ADMIN — FENTANYL CITRATE 50 MCG: 50 INJECTION, SOLUTION INTRAMUSCULAR; INTRAVENOUS at 07:32

## 2024-01-04 RX ADMIN — FENTANYL CITRATE 50 MCG: 50 INJECTION, SOLUTION INTRAMUSCULAR; INTRAVENOUS at 07:35

## 2024-01-04 RX ADMIN — LIDOCAINE HYDROCHLORIDE 0.5 ML: 10 INJECTION, SOLUTION EPIDURAL; INFILTRATION; INTRACAUDAL; PERINEURAL at 06:42

## 2024-01-04 RX ADMIN — EPHEDRINE SULFATE 5 MG: 50 INJECTION INTRAVENOUS at 08:30

## 2024-01-04 RX ADMIN — PROPOFOL 200 MG: 10 INJECTION, EMULSION INTRAVENOUS at 07:32

## 2024-01-04 RX ADMIN — BUPIVACAINE HYDROCHLORIDE 10 ML: 2.5 INJECTION, SOLUTION EPIDURAL; INFILTRATION; INTRACAUDAL; PERINEURAL at 09:13

## 2024-01-04 RX ADMIN — Medication 1000 MG: at 09:30

## 2024-01-04 RX ADMIN — EPHEDRINE SULFATE 5 MG: 50 INJECTION INTRAVENOUS at 08:21

## 2024-01-04 RX ADMIN — DROPERIDOL 0.62 MG: 2.5 INJECTION, SOLUTION INTRAMUSCULAR; INTRAVENOUS at 09:30

## 2024-01-04 RX ADMIN — LIDOCAINE HYDROCHLORIDE 50 MG: 10 INJECTION, SOLUTION EPIDURAL; INFILTRATION; INTRACAUDAL; PERINEURAL at 07:32

## 2024-01-04 RX ADMIN — FAMOTIDINE 20 MG: 20 TABLET, FILM COATED ORAL at 06:43

## 2024-01-04 RX ADMIN — PHENYLEPHRINE HYDROCHLORIDE 100 MCG: 10 INJECTION INTRAVENOUS at 07:52

## 2024-01-04 RX ADMIN — EPHEDRINE SULFATE 5 MG: 50 INJECTION INTRAVENOUS at 08:19

## 2024-01-04 RX ADMIN — SCOPALAMINE 1 PATCH: 1 PATCH, EXTENDED RELEASE TRANSDERMAL at 10:24

## 2024-01-04 RX ADMIN — PHENYLEPHRINE HYDROCHLORIDE 100 MCG: 10 INJECTION INTRAVENOUS at 07:43

## 2024-01-04 RX ADMIN — PROPOFOL 25 MCG/KG/MIN: 10 INJECTION, EMULSION INTRAVENOUS at 07:37

## 2024-01-04 RX ADMIN — SCOLOPAMINE TRANSDERMAL SYSTEM 1 PATCH: 1 PATCH, EXTENDED RELEASE TRANSDERMAL at 10:24

## 2024-01-04 RX ADMIN — SODIUM CHLORIDE 2000 MG: 900 INJECTION INTRAVENOUS at 07:31

## 2024-01-04 RX ADMIN — DEXAMETHASONE SODIUM PHOSPHATE 4 MG: 4 INJECTION, SOLUTION INTRAMUSCULAR; INTRAVENOUS at 07:32

## 2024-01-04 RX ADMIN — EPHEDRINE SULFATE 5 MG: 50 INJECTION INTRAVENOUS at 08:09

## 2024-01-04 RX ADMIN — SODIUM CHLORIDE, POTASSIUM CHLORIDE, SODIUM LACTATE AND CALCIUM CHLORIDE 9 ML/HR: 600; 310; 30; 20 INJECTION, SOLUTION INTRAVENOUS at 06:42

## 2024-01-04 RX ADMIN — TRANEXAMIC ACID 1000 MG: 10 INJECTION, SOLUTION INTRAVENOUS at 07:43

## 2024-01-04 RX ADMIN — ONDANSETRON 4 MG: 2 INJECTION INTRAMUSCULAR; INTRAVENOUS at 08:29

## 2024-01-04 RX ADMIN — PHENYLEPHRINE HYDROCHLORIDE 100 MCG: 10 INJECTION INTRAVENOUS at 08:06

## 2024-01-04 RX ADMIN — PHENYLEPHRINE HYDROCHLORIDE 100 MCG: 10 INJECTION INTRAVENOUS at 08:41

## 2024-01-04 RX ADMIN — PHENYLEPHRINE HYDROCHLORIDE 100 MCG: 10 INJECTION INTRAVENOUS at 07:50

## 2024-01-04 RX ADMIN — PHENYLEPHRINE HYDROCHLORIDE 200 MCG: 10 INJECTION INTRAVENOUS at 07:55

## 2024-01-04 NOTE — ANESTHESIA PROCEDURE NOTES
"Peripheral Block      Patient reassessed immediately prior to procedure    Patient location during procedure: OR  Reason for block: at surgeon's request and post-op pain management  Preanesthetic Checklist  Completed: patient identified, IV checked, site marked, risks and benefits discussed, surgical consent, monitors and equipment checked, pre-op evaluation and timeout performed  Prep:  Pt Position: supine  Sterile barriers:gloves, cap, sterile barriers, mask and washed/disinfected hands  Prep: ChloraPrep  Patient monitoring: blood pressure monitoring, continuous pulse oximetry and EKG  Procedure    Guidance:ultrasound guided    ULTRASOUND INTERPRETATION.  Using ultrasound guidance a 20 G gauge needle was placed in close proximity to the femoral nerve, at which point, under ultrasound guidance anesthetic was injected in the area of the nerve and spread of the anesthesia was seen on ultrasound in close proximity thereto.  There were no abnormalities seen on ultrasound; a digital image was taken; and the patient tolerated the procedure with no complications. Images:still images obtained, printed/placed on chart    Laterality:right  Block Type:femoral  Injection Technique:single-shot  Needle Type:short-bevel  Needle Gauge:20 G  Resistance on Injection: none          Post Assessment  Injection Assessment: negative aspiration for heme, no paresthesia on injection and incremental injection  Patient Tolerance:comfortable throughout block  Complications:no  Additional Notes  SINGLE Shot  A high-frequency linear transducer, with sterile cover, was placed in the inguinal crease to visualize the Femoral Vein, Artery, and Nerve (medial to lateral). The insertion site was prepped in sterile fashion. Skin and cutaneous tissue was infiltrated with 2-5 ml of 1% Lidocaine. Using ultrasound-guidance, a 20-gauge B-Doyle 4\" Ultraplex 360 non-stimulating echogenic needle was then inserted and advanced in-plane from lateral to medial " with ultrasound guidance. The needle was directed below Fascia Iliacus towards the Femoral nerve. Preservative-free normal saline was utilized for hydro-dissection of tissue. Local anesthetic injection spread, in incremental 3-5 ml injections, was visualized lateral to the artery to surround the femoral nerve. Aspiration every 5 ml to prevent intravascular injection. Injection was completed with negative aspiration of blood and negative intravascular injection. Injection pressures were normal with minimal resistance.

## 2024-01-04 NOTE — DISCHARGE INSTRUCTIONS
[facility] INCISION CARE Primary Hip and Knee:  You have a sterile dressing in place. Only exchange the dressing if it becomes saturated (fluid draining out the sides of dressing) and notify the office. The dressing is water resistant. During the first 14 days after surgery, it is ok to shower. DO NOT scrub on or around the dressing. Do not submerge the dressing.  After 14 days, you can remove your dressing. Your sutures are all underneath your skin. There is a layer of glue over the incision. DO NOT pick at this or try to peal it off. If the edges do peel up it is ok to trim them as needed. It is OK to shower with the incision exposed. DO NOT scrub on or around the incision. DO NOT submerge the incision in pools, baths, or hot tubs for 1 month after surgery.  No creams or ointments to the incision for 1 month after surgery. After 1 month, it is recommended to massage the scar with vitamin E cream to help decrease scar formation.  Check incision every day and notify surgeon immediately if any of the following signs or symptoms are noted:  Increase in redness  Increase in swelling around the incision and of the entire extremity  Increase in pain  Drainage oozing from the incision  Pulling apart of the edges of the incision  Increase in overall body temperature (greater than 100.5 degrees)    Anticoagulants: You will be discharged on an anticoagulant. This is a prophylactic medication that helps prevent blood clots during your post-operative period. Most patients will be on ***Aspirin 81 mg Enteric coated every 12 hours orally for 30 days. Some patients, due to increased risk factors, will need to be on a stronger anticoagulant. Dr. Castillo will discuss this need with you.   While taking the anticoagulant, you should avoid taking any additional aspirin, and limit ibuprofen (Advil or Motrin), Aleve (Naprosyn) or other non-steroidal anti-inflammatory medications.   Notify your surgeon immediately if any guanako bleeding is  noted in the urine, stool, vomit, or from the nose or the incision. Blood in the stool will often appear as black rather than red. Blood in urine may appear as pink. Blood in vomit may appear as brown/black like coffee grounds.  You will need to apply pressure for longer periods of time to any cuts or abrasions to stop bleeding  Avoid alcohol while taking anticoagulants  Sequential Compression Device: You maybe be discharged home with a compression device that helps promote blood flow and prevent clots in your legs. Wear these at all times for the first two weeks.   Mobilization: The best way to avoid a blood clot is to get up and walk. 10 times a day get up and walk for 5 minutes for the first two weeks. Walking for longer periods of time will increase pain and swelling, making therapy more difficult. If taking any long travel (car or plane) in the first 1-2 months, be sure to get up and walk at least every one hour.     Stool Softeners: You will be at greater risk of constipation after surgery because of being less mobile and taking the pain medications.   Take stool softeners as instructed by your surgeon while on pain medications. Use over the counter Colace 100 mg 1-2 capsules twice daily.   If stools become too loose or too frequent, decreases the dosage or stop the stool softener.  If constipation occurs despite use of stool softeners, you are to continue the stool softeners and add a laxative (Milk of Magnesia 1 ounce daily as needed).  Dulcolax oral tabs or suppository or a fleets enema can also be utilized for constipation and can be obtained over the counter.   If above interventions are unsuccessful in inducing bowel movements, please contact your family physician's office/surgeon's office.  Drink plenty of fluids and eat fruits and vegetables during your recovery time    Pain Medications utilized after surgery are narcotics and the law requires that the following information be given to all patients  that are prescribed narcotics:  CLASSIFICATION: Pain medications are called Opioids and are narcotics  LEGALITIES: It is illegal to share narcotics with others and to drive within 24 hours of taking narcotics  POTENTIAL SIDE EFFECTS: Potential side effects of opioids include: nausea, vomiting, itching, dizziness, drowsiness, dry mouth, constipation, and difficulty urinating.  POTENTIAL ADVERSE EFFECTS:   Opioid tolerance can develop with use of pain medications and this simply means that it requires more and more of the medication to control pain; however, this is seen more in patients that use Opioids for longer periods of time.  Opioid dependence can develop with use of Opioids and this simply means that to stop the medication can cause withdrawal symptoms; however, this is seen with patients that use Opioids for longer periods of time.  Opioid addiction can develop with use of Opioids and the incidence of this is very unlikely in patients who take the medications as ordered and stop the medications as instructed.  Opioid overdose can be dangerous, but is unlikely when the medication is taken as ordered and stopped when ordered. It is important not to mix opioids with alcohol or with any type of sedative, such as Benadryl, as this can lead to over sedation and respiratory difficulty.  DOSAGE:   Pain medications may be needed consistently for the first week to decrease pain and promote adequate pain relief and participation in physical therapy.  After the initial surgical pain begins to resolve, you may begin to decrease the pain medication and only take it as needed. By the end of 6 weeks, you should be off of pain medications.  You can decrease your pain medication consumption by slowly spacing out the time in between the medication, and using 650mg Tylenol when the pain is not as severe. Do not exceed 3500mg of Tylenol in 24 hours.   Refills will not be given by the office during evening hours, on weekends, or  after 6 weeks post-op.  To seek refills on pain medications during the initial 6 week post-operative period, you must call the office 48 hours in advance to request the refill. The office will then notify you when to  the prescription. DO NOT wait until you are out of the medication to request a refill.InfuBLOCK - Patient Information    What is a pain pump?  The InfuBLOCK pump delivers post-operative, non-narcotic, numbing medication to the nerve near the surgical site for pain relief.     Where can I find information about my pain pump?           For more information about your pain pump, scan the QR code.  For additional patient resources, visit [email or web address].                                                                                               While your physician is your primary source for information about your treatment there may be times during your treatment that you need assistance with your infusion pump.     If you need assistance take the following steps:    The "[employer] Nursing Hotline is Here for You 24/7.  Please call [phone] for the following concerns or complications:    Answers to questions about your infusion pump                 Tubing disconnect  Assistance with pump alarms                                                      Dislodged catheter  Excessive leakage noted from pump                                         Inadequate pain control    2.   [facility] Anesthesia Acute Pain Service: [phone] is available 24/7 for any further needs or concerns about medication or pain control.     -------------------------------------------------------------------------    Nerve Catheter Removal Instructions  When your device is empty:    Remove your catheter by pulling the dressing off slowly (like you would remove a regular bandage). The catheter should pull right out of the skin.  Check that the BLUE tip is intact.                                                                                      If the catheter is stuck, reposition your   extremity and pull slowly until removed.  *If catheter is HURTING and WON'T come out, stop and call [phone] for further assistance.    Remove medication bag from the black carrying case.  Cut the tubing on right and left side of pump, and discard the medication bag and tubing into garbage.  Place the pump and black carrying case into the plastic bag and then place this into the return box.  Seal box with blue stickers and return to [employer] postal service. THIS IS PRE-PAID POSTAGE.        -------------------------------------------------------------------------    [facility] COLD THERAPY - PATIENT INSTRUCTION SHEET    Cold Compression Therapy for your comfort and rehabilitation  Your caregivers want you to be productive in your rehab and comfortable during your stay. In keeping with those goals, you will be receiving an SMI Cold Therapy Wrap to help ease post-operative pain and swelling that might keep you from getting back on track! Your SMI Cold Therapy Wrap is effective and simple-to-use, and you will be encouraged to apply it throughout your hospital stay and at home through the duration of your recovery.    When you are ready to go home  Be sure to take your SMI Cold Therapy Wrap and both sets of Gel Bags with you for continued comfort and use throughout your rehabilitation. If you don't already have them, ask your nurse or aide to retrieve your SMI Gel Bags from the patient freezer.    Home use precautions  Always follow your medical professional's application instructions upon discharge. Your SMI Cold Therapy Wrap and Gel Bags are designed to last for months following your surgery. Never heat the Gel Bags unless specified by your healthcare provider. Supervision is advised when using this product on children or geriatric patients. To avoid danger of suffocation, please keep the outer plastic packaging away from  children & pets.    Cold Therapy Instructions  Place Gel Bags in a freezer set ¾ of the way to max temperature for at least (4) hours. For best results, lay the Gel Bags flat and [id number] in the freezer. Once frozen, slide Gel Bags into the gel pouch and secure your wrap to the affected area with the straps.  Gel wraps that have been stored in a freezer for an extended period of time may require a (10) minute period of softening up in a room temperature environment before application.  The gel pouch acts as a protective barrier. NEVER place frozen bags directly onto skin, as this may cause frostbite injury.  The [facility] Cold Therapy Wrap is designed to be able to be worm while ambulating. The compression straps can be secured well enough so that the Wrap won't fall off while moving.  Wrap Application Videos can be viewed at [email or web address].  An additional protective barrier such as clothing, a washcloth, hand-towel or pillowcase may be used during prolonged treatment applications.  The Gel-Pouch and Wrap are both Latex-Free and the Gel Bag ingredients are non toxic.    SMI Wrap care instructions  The [facility] Cold Therapy Wrap may be hand washed and hung to dry when needed.    [facility] re-order information  Additional [facility] body specific wraps and/or Gel Bags can be re-ordered from [employer] or call [email or web address] ([phone])

## 2024-01-04 NOTE — PLAN OF CARE
Goal Outcome Evaluation:  Plan of Care Reviewed With: patient        Progress: no change  Outcome Evaluation: Pt amb 100' with axillary crutches and CGAx2. Pt navigated a step with CGAx2 and frequent VC for sequencing. Mild unsteadiness noted on steps though no overt LOB observed. Recommend d/c home with 24/7 assist when medically appropriate.      Anticipated Discharge Disposition (PT): home with 24/7 care

## 2024-01-04 NOTE — H&P
"Pre-Op H&P  Ellie Meyer  [id number]  1980    Chief complaint: \"I fell\"    HPI:    Patient is a 43 y.o.female who presents with pain in her right knee after fall in the shower.  Into the emergency room.  She was found to have, with x-ray, fracture of the right patella.  She has done fairly well at home with pain control.  She is now admitted for open reduction internal fixation of the right patella.    Review of Systems:  General ROS: negative for chills, fever or skin lesions;  No changes since last office visit.  Neg for recent sick exposure  Cardiovascular ROS: no chest pain or dyspnea on exertion  Respiratory ROS: no cough, shortness of breath, or wheezing    Allergies:   Allergies   Allergen Reactions    Contrast Dye (Echo Or Unknown Ct/Mr) Shortness Of Breath, Itching and Nausea And Vomiting    Doxycycline Hives and Nausea And Vomiting    Doxylamine Nausea And Vomiting       Home Meds:    No current facility-administered medications on file prior to encounter.     Current Outpatient Medications on File Prior to Encounter   Medication Sig Dispense Refill    Drospirenone (Slynd) 4 MG tablet Take 1 tablet by mouth Daily.      insulin lispro (Admelog) 100 UNIT/ML injection 95 Units via Insulin Pump daily 90 mL 3    levothyroxine (Synthroid) 112 MCG tablet Take 1 tablet by mouth Daily. 90 tablet 3    losartan (COZAAR) 25 MG tablet Take 1 tablet by mouth Daily.      mycophenolate (CELLCEPT) 500 MG tablet Take 1 tablet by mouth 2 (Two) Times a Day.      tacrolimus (PROGRAF) 1 MG capsule Take 1 capsule by mouth 2 (Two) Times a Day.      TRUEplus Insulin Syringe 31G X 5/16\" 0.3 ML misc Use three times daily with Insulin distribution 100 each 5       PMH:   Past Medical History:   Diagnosis Date    Acid reflux     Diabetes mellitus type I     Hypertension     Hypothyroidism     PCOS (polycystic ovarian syndrome)      PSH:    Past Surgical History:   Procedure Laterality Date    CATARACT EXTRACTION W/ INTRAOCULAR " "LENS  IMPLANT, BILATERAL Bilateral     EYE SURGERY Right     PANCREAS SURGERY      PANCREAS TRANSPLANT, BUT DONOR PANCREAS WAS DAMAGED, SO SURGERY NOT COMPLETED    TRANSPLANTATION RENAL Left      Patient denies allergy to latex  Patient states that she is allergic to contrast dye.  Immunization History:  Influenza: No  Pneumococcal: No  Tetanus: Up-to-date    Social History:   Tobacco:   Social History     Tobacco Use   Smoking Status Never    Passive exposure: Never   Smokeless Tobacco Never      Alcohol:     Social History     Substance and Sexual Activity   Alcohol Use Never       Vitals:           /78 (BP Location: Right arm, Patient Position: Lying)   Pulse 91   Temp 97.1 °F (36.2 °C) (Temporal)   Resp 18   Ht 162.6 cm (64.02\")   Wt 88 kg (194 lb 0.1 oz)   SpO2 98%   BMI 33.28 kg/m²     Physical Exam:  General Appearance:    Alert, cooperative, no distress, appears stated age   Head:    Normocephalic, without obvious abnormality, atraumatic   Lungs:   Clear to auscultation bilaterally to the bases    Heart: S1-S2 without rubs murmurs or gallops    Abdomen:  Soft, nontender, bowel sounds present throughout.   Breast Exam:    deferred   Genitalia:    deferred   Extremities:   Extremities normal, atraumatic, no cyanosis or edema   Skin:   Skin color, texture, turgor normal, no rashes or lesions   Neurologic:   Grossly intact   Results Review  LABS:  Lab Results   Component Value Date    WBC 12.46 (H) 01/03/2024    HGB 11.3 (L) 01/03/2024    HCT 34.9 01/03/2024    MCV 89.7 01/03/2024     01/03/2024    NEUTROABS 9.79 (H) 01/03/2024    GLUCOSE 161 (H) 01/03/2024    BUN 22 (H) 01/03/2024    CREATININE 1.07 (H) 01/03/2024    EGFRIFNONA 57 (L) 11/04/2020     01/03/2024    K 4.0 01/03/2024     01/03/2024    CO2 22.0 01/03/2024    CALCIUM 9.4 01/03/2024    ALBUMIN 4.0 01/03/2024    AST 18 01/03/2024    ALT 16 01/03/2024    BILITOT 0.7 01/03/2024    PTT 28.5 01/03/2024    INR 1.03 01/03/2024 "       RADIOLOGY:  No radiology results for the last 3 days     I reviewed the patient's new clinical results.    Cancer Staging (if applicable)  Cancer Patient: __ yes __no __unknown; If yes, clinical stage T:__ N:__M:__, stage group or __N/A    Impression: Traumatic right patellar fracture  Diabetes mellitus  Hypothyroidism  Status post kidney transplant  Allergy to contrast dye, doxycycline    Plan: Open reduction internal fixation right patella      MARIA ISABEL Adams   01/04/24   6:41 AM EST

## 2024-01-04 NOTE — ANESTHESIA PREPROCEDURE EVALUATION
Anesthesia Evaluation     Patient summary reviewed and Nursing notes reviewed                Airway   Mallampati: II  TM distance: >3 FB  Neck ROM: full  No difficulty expected  Dental - normal exam     Pulmonary - negative pulmonary ROS and normal exam   Cardiovascular - normal exam    (+) hypertension      Neuro/Psych- negative ROS  GI/Hepatic/Renal/Endo    (+) GERD, renal disease (s/p kidney txp)-, diabetes mellitus, thyroid problem hypothyroidism    Musculoskeletal (-) negative ROS    Abdominal  - normal exam    Bowel sounds: normal.   Substance History - negative use     OB/GYN negative ob/gyn ROS         Other                      Anesthesia Plan    ASA 3     general with block     Reason for not using neuraxial anesthesia or peripheral nerve block: Patient Preference  (Peripheral nerve block + cath for post op pain relief)  intravenous induction     Anesthetic plan, risks, benefits, and alternatives have been provided, discussed and informed consent has been obtained with: patient.    Plan discussed with CRNA.    CODE STATUS:

## 2024-01-04 NOTE — OP NOTE
PATELLA OPEN REDUCTION INTERNAL FIXATION  Procedure Report    Patient Name:  Ellie Meyer  YOB: 1980    Date of Surgery:  1/4/2024     Indications:    Patient is a 43 y.o. female status post fall with a displaced mid pole patella fracture and inability to perform straight leg raise.   Likely risks and benefits of the procedure including but not limited to infection, DVT, pulmonary embolism, possible injury to tendons, ligaments, nerves and/or vessels and fracture has been discussed in detail, as well as nonunion. Despite the risks involved, the patient elected to proceed with an informed consent obtained and the patient was scheduled for surgery. The patient was seen in the preoperative holding area and the operative extremity was marked.    Pre-op Diagnosis:   Right patella fracture [850989]       Post-Op Diagnosis Codes:     * Right patella fracture [S82.001A]    Procedure/CPT® Codes:      Procedure(s):  PATELLA OPEN REDUCTION INTERNAL FIXATION    Staff:  Surgeon(s):  Girish Castillo MD    Anesthesia: General with Block    Estimated Blood Loss: 100ml    Implants:    Implant Name Type Inv. Item Serial No.  Lot No. LRB No. Used Action   SUT FIBERTAPE POLYBLEND/BR ISABELLA STR/NDL 2MM - [id number] Implant SUT FIBERTAPE POLYBLEND/BR ISABELLA STR/NDL 2MM  [id number] Right 1 Implanted   K-wire      Right 2 Implanted   SCRW LP FREDA BLUNT/TP 4X26MM - [id number] Implant SCRW LP FREDA BLUNT/TP 4X26MM  [employer]  Right 1 Implanted   SCRW FREDA BLNT/TP LP 4X28MM - [id number] Implant SCRW FREDA BLNT/TP LP 4X28MM  [employer]  Right 1 Implanted       Specimen:          None      Findings: Displaced mid pole patella fracture    Complications: None    Description of Procedure:   The patient was transferred to [facility] operating room. Preoperative antibiotics were given IV and infused prior to skin incision and to inflation of the tourniquet according to SCIP protocol. Prior to skin  incision, the patient also received a femoral nerve block block. Surgical timeout was performed with the entire operative team identifying the correct patient, surgical site, and planned procedure. The patient underwent light general anesthesia with LMA. A well-padded tourniquet was placed to the proximal aspect of the operative thigh. The operative leg was then prepped and draped in the usual sterile fashion. After application of Esmarch, the tourniquet was inflated to 250 mmHg.    A skin incision was made vertically oriented centering over the patella anteriorly. The skin and subcutaneous tissue were incised and there was a significant hemarthrosis.   There is a mid pole patella fracture with slight comminution in the inferior pole the fracture edges were cleaned we used a point-to-point reduction forcep and guided anatomic reduction of the fracture using fluoroscopy we then placed 2 K wires from distal to proximal confirm them to be in good position on AP and lateral views and placed 2 cannulated screws 28 mm in length across following this we then shuttled fiber tapes through the screws and figure of 8 fashion and tied them down to the inferior lateral pole with good compression with the knee in hyperextension.  Knee was taken through range of motion and the fracture reduction found to be stable.  We then repaired the medial lateral retinaculum with #2 FiberWire's.  Tourniquet was dropped hemostasis was achieved the wound was thoroughly irrigated and skin closure was begun.    The sponge, needle, and instrument counts were found to be correct. 2-0 Vicryl for skin followed by a Monocryl and Prineo dressing with the knee held in extension. Sterile Mepilex AG were then placed over the incision wrapped with web roll.  Patient was placed in a T ROM knee brace locked in extension. The patient tolerated the procedure well. The patient will start anticoagulation therapy on postoperative day #1. Patient will need to be  mobilized with physical therapy.    I discussed the satisfactory performance of the procedure with patient's family and discussed with them the postoperative management.        Assistant Participation:  Surgeon(s):  Girish Castillo MD    Assistant: Roger Abraham PA-C assisted with proper preoperative positioning, preoperative templating, determingin availability of proper implants, prepping and draping of patient, manipulation placement of instruments, protection of ligaments and vital soft tissue structures, assistance in maintaining hemostasis and assistance with closure of the wound. Their skills and knowledge of the steps of operation and the desired outcome of each surgical step was crucial, allowing for efficient choreography of surgical procedure, and closure of the wound which lead to reduced surgical time, less blood loss, and less risk of complications for the patient.     Girish Castillo MD     Date: 1/4/2024  Time: 09:07 EST

## 2024-01-04 NOTE — ANESTHESIA PROCEDURE NOTES
Airway  Urgency: elective    Date/Time: 1/4/2024 7:34 AM  Airway not difficult    General Information and Staff    Patient location during procedure: OR  CRNA/CAA: Ashu Goodman CRNA    Indications and Patient Condition  Indications for airway management: airway protection    Preoxygenated: yes  Mask difficulty assessment: 1 - vent by mask    Final Airway Details  Final airway type: supraglottic airway      Successful airway: I-gel  Size 3     Number of attempts at approach: 1  Assessment: lips, teeth, and gum same as pre-op    Additional Comments  LMA placed without difficulty, ventilation with assist, equal breath sounds and symmetric chest rise and fall

## 2024-01-04 NOTE — ANESTHESIA POSTPROCEDURE EVALUATION
Patient: Ellie Meyer    Procedure Summary       Date: 01/04/24 Room / Location:  [facility]  [facility]    Anesthesia Start: 0729 Anesthesia Stop: 0912    Procedure: PATELLA OPEN REDUCTION INTERNAL FIXATION (Right: Knee) Diagnosis:       Right patella fracture      (Right patella fracture [[id number]])    Surgeons: Girish Castillo MD Provider: Piotr Martinez MD    Anesthesia Type: general with block ASA Status: 3            Anesthesia Type: general with block    Vitals  Vitals Value Taken Time   BP     Temp     Pulse 100 01/04/24 0911   Resp     SpO2 97 % 01/04/24 0911   Vitals shown include unfiled device data.        Post Anesthesia Care and Evaluation    Patient location during evaluation: PACU  Patient participation: complete - patient participated  Level of consciousness: sleepy but conscious  Pain management: adequate    Airway patency: patent  Anesthetic complications: No anesthetic complications  PONV Status: none  Cardiovascular status: hemodynamically stable and acceptable  Respiratory status: nonlabored ventilation, acceptable and nasal cannula  Hydration status: acceptable

## 2024-01-04 NOTE — THERAPY EVALUATION
Patient Name: Ellie Meyer  : 1980    MRN: [id number]                              Today's Date: 2024       Admit Date: 2024    Visit Dx: No diagnosis found.  Patient Active Problem List   Diagnosis    Hypothyroidism    Insulin pump titration    Type 1 diabetes mellitus with hyperglycemia    After cataract not obscuring vision, bilateral    Bilateral ocular hypertension    Kidney transplant recipient    PFD (pelvic floor dysfunction)    Proliferative diabetic retinopathy    Recurrent UTI    Stable proliferative diabetic retinopathy of left eye associated with type 1 diabetes mellitus    Urethral pain    Urge incontinence    Urinary urgency     Past Medical History:   Diagnosis Date    Acid reflux     Diabetes mellitus type I     Hypertension     Hypothyroidism     PCOS (polycystic ovarian syndrome)      Past Surgical History:   Procedure Laterality Date    CATARACT EXTRACTION W/ INTRAOCULAR LENS  IMPLANT, BILATERAL Bilateral     EYE SURGERY Right     PANCREAS SURGERY      PANCREAS TRANSPLANT, BUT DONOR PANCREAS WAS DAMAGED, SO SURGERY NOT COMPLETED    TRANSPLANTATION RENAL Left       General Information       Row Name 24 1052          Physical Therapy Time and Intention    Document Type evaluation  -     Mode of Treatment physical therapy  -       Row Name 24 1052          General Information    Patient Profile Reviewed yes  -     Prior Level of Function independent:;all household mobility;community mobility;gait;transfer;bed mobility;ADL's  fall 23  -     Existing Precautions/Restrictions fall;brace on at all times;other (see comments)  WBAT; T ROM brace locked in extension  -     Barriers to Rehab none identified  -       Row Name 24 1052          Living Environment    People in Home spouse  -       Row Name 24 1052          Home Main Entrance    Number of Stairs, Main Entrance three  -     Stair Railings, Main Entrance none  -       Row Name  01/04/24 1052          Stairs Within Home, Primary    Stairs, Within Home, Primary BR upstairs  -     Number of Stairs, Within Home, Primary twelve  -       Row Name 01/04/24 1052          Cognition    Orientation Status (Cognition) oriented x 3  -       Row Name 01/04/24 1052          Safety Issues, Functional Mobility    Safety Issues Affecting Function (Mobility) awareness of need for assistance;insight into deficits/self-awareness  -     Impairments Affecting Function (Mobility) balance;endurance/activity tolerance;pain;range of motion (ROM);strength  -               User Key  (r) = Recorded By, (t) = Taken By, (c) = Cosigned By      Initials Name Provider Type     Ela Gayle PT Physical Therapist                   Mobility       Row Name 01/04/24 1053          Bed Mobility    Bed Mobility supine-sit;sit-supine  -     Supine-Sit [place] (Bed Mobility) standby assist  -     Sit-Supine [place] (Bed Mobility) standby assist  -     Comment, (Bed Mobility) Good ability to manage RLE to EOB without assistance. VC for line management  -       Row Name 01/04/24 1053          Transfers    Comment, (Transfers) VC for hand placement and sequencing with crutches.  -       Row Name 01/04/24 1053          Gait/Stairs (Locomotion)    [place] Level (Gait) contact guard;nonverbal cues (demo/gesture);verbal cues;2 person assist  -     Assistive Device (Gait) crutches, axillary  -     Distance in Feet (Gait) 100  -     Deviations/Abnormal Patterns (Gait) bilateral deviations;base of support, wide;weight shifting decreased;gait speed decreased;stride length decreased  -     Bilateral Gait Deviations forward flexed posture;heel strike decreased  -     [place] Level (Stairs) contact guard;nonverbal cues (demo/gesture);verbal cues;2 person assist  -     Assistive Device (Stairs) crutches, axillary  -     Number of Steps (Stairs) 2  -     Ascending Technique (Stairs)  step-to-step  -     Descending Technique (Stairs) step-to-step  -     Comment, (Gait/Stairs) Pt amb with crutches with step-through gait pattern. Slow gait speed noted. VC for upright posture, forward gaze, and sequencing. Pt navigated a step twice with CGAx2 and frequent VC for sequencing. Mild unsteadiness noted. Activity limited by fatigue and nausea. RN managing.  -       Row Name 01/04/24 1053          Mobility    Extremity Weight-bearing Status right lower extremity  -     Right Lower Extremity (Weight-bearing Status) weight-bearing as tolerated (WBAT)  -               User Key  (r) = Recorded By, (t) = Taken By, (c) = Cosigned By      Initials Name Provider Type     Ela Gayle PT Physical Therapist                   Obj/Interventions       [facility] Name 01/04/24 1056          Range of Motion Comprehensive    General Range of Motion lower extremity range of motion deficits identified  -     Comment, General Range of Motion R knee locked in extension  -[facility] Name 01/04/24 1056          Strength Comprehensive (MMT)    General Manual Muscle Testing (MMT) Assessment lower extremity strength deficits identified  -     Comment, General Manual Muscle Testing (MMT) Assessment Pt able to perform B active ankle DF and SLR  -       Row Name 01/04/24 1056          Motor Skills    Therapeutic Exercise ankle  -[facility] Name 01/04/24 1056          Ankle (Therapeutic Exercise)    Ankle (Therapeutic Exercise) AROM (active range of motion)  -     Ankle AROM (Therapeutic Exercise) bilateral;dorsiflexion;plantarflexion;10 repetitions  -[facility] Name 01/04/24 1056          Balance    Balance Assessment sitting static balance;sitting dynamic balance;sit to stand dynamic balance;standing static balance;standing dynamic balance  -     Static Sitting Balance standby assist  -     Dynamic Sitting Balance standby assist  -     Position, Sitting Balance sitting edge of bed  -     Static Standing  Balance contact guard  -     Dynamic Standing Balance contact guard  -     Position/Device Used, Standing Balance supported;crutches, axillary  -     Balance Interventions sitting;standing;sit to stand;occupation based/functional task  -       Row Name 01/04/24 1056          Sensory Assessment (Somatosensory)    Sensory Assessment (Somatosensory) LE sensation intact  -               User Key  (r) = Recorded By, (t) = Taken By, (c) = Cosigned By      Initials Name Provider Type     Ela Gayle, PT Physical Therapist                   Goals/Plan       [facility] Name 01/04/24 1058          Bed Mobility Goal 1 (PT)    Activity/Assistive Device (Bed Mobility Goal 1, PT) sit to supine/supine to sit  -HW     [place] Level/Cues Needed (Bed Mobility Goal 1, PT) modified independence  -HW     Time Frame (Bed Mobility Goal 1, PT) long term goal (LTG);3 days  -[facility] Name 01/04/24 1058          Transfer Goal 1 (PT)    Activity/Assistive Device (Transfer Goal 1, PT) sit-to-stand/stand-to-sit  -HW     [place] Level/Cues Needed (Transfer Goal 1, PT) modified independence  -HW     Time Frame (Transfer Goal 1, PT) long term goal (LTG);3 days  -[facility] Name 01/04/24 1058          Gait Training Goal 1 (PT)    Activity/Assistive Device (Gait Training Goal 1, PT) gait (walking locomotion)  -     [place] Level (Gait Training Goal 1, PT) modified independence  -HW     Distance (Gait Training Goal 1, PT) 150  -HW     Time Frame (Gait Training Goal 1, PT) long term goal (LTG);3 days  -[facility] Name 01/04/24 1058          Stairs Goal 1 (PT)    Activity/Assistive Device (Stairs Goal 1, PT) ascending stairs;descending stairs  -HW     [place] Level/Cues Needed (Stairs Goal 1, PT) modified independence  -HW     Number of Stairs (Stairs Goal 1, PT) 13  -HW     Time Frame (Stairs Goal 1, PT) long term goal (LTG);3 days  -[facility] Name 01/04/24 1058          Therapy Assessment/Plan (PT)    Planned  Therapy Interventions (PT) balance training;bed mobility training;gait training;home exercise program;ROM (range of motion);patient/family education;stair training;strengthening;stretching;transfer training  -               User Key  (r) = Recorded By, (t) = Taken By, (c) = Cosigned By      Initials Name Provider Type    HW Ela Gayle PT Physical Therapist                   Clinical Impression       Row Name 01/04/24 1057          Pain    Pretreatment Pain Rating 2/10  -HW     Posttreatment Pain Rating 2/10  -     Pain Location - Side/Orientation Right  -     Pain Location incisional  -     Pain Location - knee  -     Pain Intervention(s) Repositioned;Ambulation/increased activity;Elevated  -       Row Name 01/04/24 1057          Plan of Care Review    Plan of Care Reviewed With patient  -     Progress no change  -     Outcome Evaluation Pt amb 100' with axillary crutches and CGAx2. Pt navigated a step with CGAx2 and frequent VC for sequencing. Mild unsteadiness noted on steps though no overt LOB observed. Recommend d/c home with 24/7 assist when medically appropriate.  -       Row Name 01/04/24 1057          Therapy Assessment/Plan (PT)    Rehab Potential (PT) good, to achieve stated therapy goals  -     Criteria for Skilled Interventions Met (PT) yes;meets criteria;skilled treatment is necessary  -     Therapy Frequency (PT) daily  -       Row Name 01/04/24 1057          Vital Signs    Pre Patient Position Supine  -     Intra Patient Position Standing  -     Post Patient Position Supine  -       Row Name 01/04/24 1057          Positioning and Restraints    Pre-Treatment Position in bed  -     Post Treatment Position bed  -HW     In Bed notified nsg;supine;fowlers;encouraged to call for assist;patient within staff view;side rails up x2  -               User Key  (r) = Recorded By, (t) = Taken By, (c) = Cosigned By      Initials Name Provider Type     Ela Gayle PT Physical  Therapist                   Outcome Measures       Row Name 01/04/24 1059          How much help from another person do you currently need...    Turning from your back to your side while in flat bed without using bedrails? 4  -HW     Moving from lying on back to sitting on the side of a flat bed without bedrails? 4  -HW     Moving to and from a bed to a chair (including a wheelchair)? 4  -HW     Standing up from a chair using your arms (e.g., wheelchair, bedside chair)? 4  -HW     Climbing 3-5 steps with a railing? 3  -HW     To walk in hospital room? 3  -     AM-PAC 6 Clicks Score (PT) 22  -     Highest Level of Mobility Goal 7 --> Walk 25 feet or more  -       Row Name 01/04/24 1059          Functional Assessment    Outcome Measure Options AM-PAC 6 Clicks Basic Mobility (PT)  -               User Key  (r) = Recorded By, (t) = Taken By, (c) = Cosigned By      Initials Name Provider Type     Ela Gayle PT Physical Therapist                                 Physical Therapy Education       Title: PT OT SLP Therapies (In Progress)       Topic: Physical Therapy (In Progress)       Point: Mobility training (Done)       Learning Progress Summary             Patient Acceptance, E,D, VU,DU by  at 1/4/2024 1059                         Point: Home exercise program (Not Started)       Learner Progress:  Not documented in this visit.              Point: Body mechanics (Done)       Learning Progress Summary             Patient Acceptance, E,D, VU,DU by  at 1/4/2024 1059                         Point: Precautions (Done)       Learning Progress Summary             Patient Acceptance, E,D, VU,DU by  at 1/4/2024 1059                                         User Key       Initials Effective Dates Name Provider Type Discipline     12/15/23 -  Ela Gayle, NICOLAS Physical Therapist PT                  PT Recommendation and Plan  Planned Therapy Interventions (PT): balance training, bed mobility training, gait  training, home exercise program, ROM (range of motion), patient/family education, stair training, strengthening, stretching, transfer training  Plan of Care Reviewed With: patient  Progress: no change  Outcome Evaluation: Pt amb 100' with axillary crutches and CGAx2. Pt navigated a step with CGAx2 and frequent VC for sequencing. Mild unsteadiness noted on steps though no overt LOB observed. Recommend d/c home with 24/7 assist when medically appropriate.     Time Calculation:   PT Evaluation Complexity  History, PT Evaluation Complexity: 1-2 personal factors and/or comorbidities  Examination of Body Systems (PT Eval Complexity): total of 3 or more elements  Clinical Presentation (PT Evaluation Complexity): stable  Clinical Decision Making (PT Evaluation Complexity): low complexity  Overall Complexity (PT Evaluation Complexity): low complexity     PT Charges       Row Name 01/04/24 1059             Time Calculation    Start Time 1013  -HW      PT Received On 01/04/24  -      PT Goal Re-Cert Due Date 01/14/24  -         Timed Charges    05785 - Gait Training Minutes  10  -HW         Untimed Charges    PT Eval/Re-eval Minutes 35  -HW         Total Minutes    Timed Charges Total Minutes 10  -HW      Untimed Charges Total Minutes 35  -HW       Total Minutes 45  -HW                User Key  (r) = Recorded By, (t) = Taken By, (c) = Cosigned By      Initials Name Provider Type     Ela Gayle, NICOLAS Physical Therapist                  Therapy Charges for Today       Code Description Service Date Service Provider Modifiers Qty    35441138082 HC GAIT TRAINING EA 15 MIN 1/4/2024 Ela Gayle, PT GP 1    55362943475 HC PT EVAL LOW COMPLEXITY 3 1/4/2024 Ela Gayle, PT GP 1    70343623318 HC PT THER SUPP EA 15 MIN 1/4/2024 Ela Gayle, PT GP 3            PT G-Codes  Outcome Measure Options: AM-PAC 6 Clicks Basic Mobility (PT)  AM-PAC 6 Clicks Score (PT): 22  PT Discharge Summary  Anticipated Discharge Disposition (PT): home  with 24/7 care    Ela Gayle, PT  1/4/2024

## 2024-01-04 NOTE — ANESTHESIA PROCEDURE NOTES
Femoral nerve cath      Patient reassessed immediately prior to procedure    Patient location during procedure: post-op  Reason for block: at surgeon's request and post-op pain management  Performed by  CRNA/CAA: Piotr Leblanc CRNA  Assisted by: Vickie Noland RN  Preanesthetic Checklist  Completed: patient identified, IV checked, site marked, risks and benefits discussed, surgical consent, monitors and equipment checked, pre-op evaluation and timeout performed  Prep:  Pt Position: supine  Sterile barriers:gloves, cap, sterile barriers, mask and washed/disinfected hands  Prep: ChloraPrep  Patient monitoring: blood pressure monitoring, continuous pulse oximetry and EKG  Procedure    Sedation: no  Performed under: local infiltration  Guidance:ultrasound guided    ULTRASOUND INTERPRETATION.  Using ultrasound guidance a 20 G gauge needle was placed in close proximity to the femoral nerve, at which point, under ultrasound guidance anesthetic was injected in the area of the nerve and spread of the anesthesia was seen on ultrasound in close proximity thereto.  There were no abnormalities seen on ultrasound; a digital image was taken; and the patient tolerated the procedure with no complications. Images:still images obtained, printed/placed on chart    Laterality:right  Block Type:femoral  Injection Technique:single-shot  Needle Type:short-bevel  Needle Gauge:20 G  Resistance on Injection: none  Cath Depth at skin: 13 cm    Medications Used: bupivacaine PF (MARCAINE) 0.25 % injection - Injection   10 mL - 1/4/2024 9:13:00 AM      Post Assessment  Injection Assessment: negative aspiration for heme, no paresthesia on injection and incremental injection  Patient Tolerance:comfortable throughout block  Complications:no  Additional Notes  CATHETER  A high-frequency linear transducer, with sterile cover, was placed in the inguinal crease to visualize the Femoral Vein, Artery, and Nerve (medial to lateral). The insertion site  "was prepped in sterile fashion. Skin and cutaneous tissue was infiltrated with 2-5 ml of 1% Lidocaine. Using ultrasound-guidance, an 18-gauge Contiplex Ultra 360 Touhy Needle was then inserted and advanced in-plane from lateral to medial with ultrasound guidance. The Touhy needle was directed below Fascia Iliacus towards the Femoral nerve. Preservative-free normal saline was utilized for hydro-dissection of tissue. Local anesthetic injection spread, in incremental 3-5 ml injections, was visualized lateral to the artery to surround the femoral nerve. Aspiration every 5 ml to prevent intravascular injection. Injection was completed with negative aspiration of blood and negative intravascular injection. Injection pressures were normal with minimal resistance. A 20-gauge Contiplex Echo catheter was placed through the needle and advanced out the tip of the Touhy 1-3 cm. The Touhy needle was then removed, and final catheter position verified lateral to the femoral artery. The catheter was secured in the usual fashion with skin glue, benzoin, steri-strips, CHG tegaderm and Label noting \"Nerve Block Catheter\". Jerk tape applied at yellow connector and catheter connection.             "

## 2024-01-04 NOTE — ANESTHESIA PROCEDURE NOTES
Adductor canal single      Patient reassessed immediately prior to procedure    Patient location during procedure: OR  Reason for block: at surgeon's request and post-op pain management  Preanesthetic Checklist  Completed: patient identified, IV checked, site marked, risks and benefits discussed, surgical consent, monitors and equipment checked, pre-op evaluation and timeout performed  Prep:  Pt Position: supine  Sterile barriers:cap, gloves, mask, sterile barriers and washed/disinfected hands  Prep: ChloraPrep  Patient monitoring: blood pressure monitoring, continuous pulse oximetry and EKG  Procedure  Performed under: spinal  Guidance:ultrasound guided    ULTRASOUND INTERPRETATION.  Using ultrasound guidance a 20 G gauge needle was placed in close proximity to the nerve, at which point, under ultrasound guidance anesthetic was injected in the area of the nerve and spread of the anesthesia was seen on ultrasound in close proximity thereto.  There were no abnormalities seen on ultrasound; a digital image was taken; and the patient tolerated the procedure with no complications. Images:still images obtained, printed/placed on chart    Block Type:adductor canal block  Injection Technique:single-shot  Needle Type:echogenic and short-bevel  Needle Gauge:20 G  Resistance on Injection: none  Catheter size: 20g.          Post Assessment  Injection Assessment: negative aspiration for heme, incremental injection and no paresthesia on injection  Patient Tolerance:comfortable throughout block  Complications:no  Additional Notes  SINGLE shot   A high-frequency linear transducer, with sterile cover, was placed on the anterior mid-thigh (between the anterior superior iliac spine and patella). The transducer was then moved medially to identify the Sartorius muscle (Amber), Vastus Medialis muscle (VMM), Superficial Femoral Artery (SFA) and Vein. The transducer was then moved cephalad or caudad to position the SFA in the middle of the  "Amber. The insertion site was prepped and draped in sterile fashion. Skin and cutaneous tissue was infiltrated with 2-5 ml of 1% Lidocaine. Using ultrasound-guidance, a 20-gauge B-Doyle 4\" Ultraplex 360 non-stimulating echogenic needle was advanced in plane from lateral to medial. Preservative-free normal saline was utilized for hydro-dissection of tissue, advancement of needle, and to confirm needle placement below the fascial plane of the Amber where the Nerve to the VMM is located. Local anesthetic (LA) 5 ml deposited here. The needle continues its path lateral to the SFA at the level of the Saphenous Nerve. The remainder of the LA was deposited at the 10-11 o'clock position of the SFA. This injection created a space between the Amber and the SFA. Aspiration every 5 ml to prevent intravascular injection. Injection was completed with negative aspiration of blood and negative intravascular injection. Injection pressures were normal with minimal resistance.             "

## 2024-01-08 LAB
COTININE SERPL-MCNC: <1 NG/ML
NICOTINE SERPL-MCNC: <1 NG/ML

## 2024-03-12 ENCOUNTER — OFFICE VISIT (OUTPATIENT)
Dept: ENDOCRINOLOGY | Facility: CLINIC | Age: 44
End: 2024-03-12
Payer: COMMERCIAL

## 2024-03-12 VITALS
HEIGHT: 64 IN | DIASTOLIC BLOOD PRESSURE: 72 MMHG | BODY MASS INDEX: 33.46 KG/M2 | WEIGHT: 196 LBS | SYSTOLIC BLOOD PRESSURE: 130 MMHG | HEART RATE: 82 BPM

## 2024-03-12 DIAGNOSIS — E03.9 ACQUIRED HYPOTHYROIDISM: ICD-10-CM

## 2024-03-12 DIAGNOSIS — E10.65 TYPE 1 DIABETES MELLITUS WITH HYPERGLYCEMIA: Primary | ICD-10-CM

## 2024-03-12 LAB
EXPIRATION DATE: NORMAL
EXPIRATION DATE: NORMAL
GLUCOSE BLDC GLUCOMTR-MCNC: 72 MG/DL (ref 70–130)
HBA1C MFR BLD: 5.7 % (ref 4.5–5.7)
Lab: NORMAL
Lab: NORMAL

## 2024-03-12 PROCEDURE — 99214 OFFICE O/P EST MOD 30 MIN: CPT | Performed by: INTERNAL MEDICINE

## 2024-03-12 PROCEDURE — 83036 HEMOGLOBIN GLYCOSYLATED A1C: CPT | Performed by: INTERNAL MEDICINE

## 2024-03-12 PROCEDURE — 82947 ASSAY GLUCOSE BLOOD QUANT: CPT | Performed by: INTERNAL MEDICINE

## 2024-03-12 NOTE — PROGRESS NOTES
Subjective:     Chief Complaint   Patient presents with    Diabetes      Ellie Meyer is a 43 y.o. female who is is being seen for follow-up  Type 1 diabetes mellitus.    The initial diagnosis of diabetes was made in childhood and she had variable glycemic control with multiple complications.  Diabetic complications: nephropathy, retinopathy s/p surgeries vitrectomy and multiple laser surgeries and injections; kidney disease s/p renal transplant. She is followed with the transplant team in [facility].     Current diabetic medications include insulin Humalog via insulin pump Medtronic 780. She has 670G but doesn't use sensor because of the cost.  She tests 10-12 times per day and keeps glucose in goal.      Monitoring  - Insulin pump downloaded and reviewed the data. She is not using the sensor, uses manual bolus.    Hypothyroidism is managed with levothyroxine 112 mcg daily. She is taking a brand name. TFT were normal 10/13/2023. TSH was 1.43  Labs reviewed.     She had fracture right knee in 12/2023. She is using the knee support device and healing well. She had high glucose after surgery, improved now. Glucose is at goal most of the time.       MEDICATIONS    Current Outpatient Medications:     Drospirenone (Slynd) 4 MG tablet, Take 1 tablet by mouth Daily., Disp: , Rfl:     insulin lispro (Admelog) 100 UNIT/ML injection, 95 Units via Insulin Pump daily, Disp: 90 mL, Rfl: 3    levothyroxine (Synthroid) 112 MCG tablet, Take 1 tablet by mouth Daily., Disp: 90 tablet, Rfl: 3    losartan (COZAAR) 25 MG tablet, Take 1 tablet by mouth Daily., Disp: , Rfl:     methenamine (HIPREX) 1 g tablet, Take 1 tablet by mouth 2 (Two) Times a Day., Disp: , Rfl:     mycophenolate (CELLCEPT) 500 MG tablet, Take 1 tablet by mouth 2 (Two) Times a Day., Disp: , Rfl:     omeprazole (priLOSEC) 20 MG capsule, Take 1 capsule by mouth Daily., Disp: , Rfl:     tacrolimus (PROGRAF) 1 MG capsule, Take 1 capsule by mouth 2 (Two) Times a Day.,  "Disp: , Rfl:     TRUEplus Insulin Syringe 31G X 5/16\" 0.3 ML misc, Use three times daily with Insulin distribution, Disp: 100 each, Rfl: 5    Review of Systems  Review of Systems   Constitutional:  Positive for fatigue.   Eyes:  Positive for redness and visual disturbance.   Cardiovascular:  Positive for leg swelling.   Skin:         Hair loss   Neurological:  Positive for dizziness and headache.   Psychiatric/Behavioral:  Positive for sleep disturbance.    All other systems reviewed and are negative.        Objective:      /72   Pulse 82   Ht 162.6 cm (64.02\")   Wt 88.9 kg (196 lb)   BMI 33.63 kg/m² Body mass index is 33.63 kg/m².  Physical Exam   Constitutional: She is oriented to person, place, and time. She appears well-developed and well-nourished.   HENT:   Head: Normocephalic and atraumatic.   Cardiovascular: Normal rate, regular rhythm, normal heart sounds and normal pulses.   Pulmonary/Chest: Effort normal and breath sounds normal.   Neurological: She is alert and oriented to person, place, and time.   Psychiatric: Thought content normal.           LABS AND IMAGING    Labs:   Lab Results   Component Value Date    HGBA1C 5.7 03/12/2024    HGBA1C 6.20 (H) 01/03/2024    HGBA1C 5.7 12/01/2023     Office Visit on 03/12/2024   Component Date Value Ref Range Status    Glucose 03/12/2024 72  70 - 130 mg/dL Final    Lot Number 03/12/2024 [id number]   Final    Expiration Date 03/12/2024 10/05/2024   Final    Hemoglobin A1C 03/12/2024 5.7  4.5 - 5.7 % Final    Lot Number 03/12/2024 [id number]   Final    Expiration Date 03/12/2024 10/05/2024   Final   Admission on 01/04/2024, Discharged on 01/04/2024   Component Date Value Ref Range Status    HCG, Urine, QL 01/04/2024 Negative  Negative Final    Lot Number 01/04/2024 [id number]   Final    Internal Positive Control 01/04/2024 Passed  Positive, Passed Final    Internal Negative Control 01/04/2024 Passed  Negative, Passed Final    Expiration Date 01/04/2024 " 02/04/2025   Final    Glucose 01/04/2024 166 (H)  70 - 130 mg/dL Final    Glucose 01/04/2024 132 (H)  70 - 130 mg/dL Final    Glucose 01/04/2024 209 (H)  70 - 130 mg/dL Final   Pre-Admission Testing on 01/03/2024   Component Date Value Ref Range Status    Glucose 01/03/2024 161 (H)  65 - 99 mg/dL Final    BUN 01/03/2024 22 (H)  6 - 20 mg/dL Final    Creatinine 01/03/2024 1.07 (H)  0.57 - 1.00 mg/dL Final    Sodium 01/03/2024 139  136 - 145 mmol/L Final    Potassium 01/03/2024 4.0  3.5 - 5.2 mmol/L Final    Chloride 01/03/2024 106  98 - 107 mmol/L Final    CO2 01/03/2024 22.0  22.0 - 29.0 mmol/L Final    Calcium 01/03/2024 9.4  8.6 - 10.5 mg/dL Final    Total Protein 01/03/2024 7.1  6.0 - 8.5 g/dL Final    Albumin 01/03/2024 4.0  3.5 - 5.2 g/dL Final    ALT (SGPT) 01/03/2024 16  1 - 33 U/L Final    AST (SGOT) 01/03/2024 18  1 - 32 U/L Final    Alkaline Phosphatase 01/03/2024 45  39 - 117 U/L Final    Total Bilirubin 01/03/2024 0.7  0.0 - 1.2 mg/dL Final    Globulin 01/03/2024 3.1  gm/dL Final    Calculated Result    A/G Ratio 01/03/2024 1.3  g/dL Final    BUN/Creatinine Ratio 01/03/2024 20.6  7.0 - 25.0 Final    Anion Gap 01/03/2024 11.0  5.0 - 15.0 mmol/L Final    eGFR 01/03/2024 65.8  >60.0 mL/min/1.73 Final    Protime 01/03/2024 13.6  12.2 - 14.5 Seconds Final    INR 01/03/2024 1.03  0.89 - 1.12 Final    PTT 01/03/2024 28.5  22.0 - 39.0 seconds Final    Sed Rate 01/03/2024 34 (H)  0 - 20 mm/hr Final    C-Reactive Protein 01/03/2024 1.26 (H)  0.00 - 0.50 mg/dL Final    25 Hydroxy, Vitamin D 01/03/2024 42.7  30.0 - 100.0 ng/ml Final    Nicotine 01/03/2024 <1.0  ng/mL Final    This test was developed and its performance characteristics  determined by [facility]. It has not been cleared or approved  by the Food and Drug Administration.  Nicotine levels greater than 2.0 are consistent with the use of  tobacco or tobacco cessation products.    Cotinine 01/03/2024 <1.0  ng/mL Final    This test was developed and its  performance characteristics  determined by [facility]. It has not been cleared or approved  by the Food and Drug Administration.  Cotinine levels greater than 20.0 are consistent with the use of  tobacco or tobacco cessation products.    MRSA PCR 01/03/2024 Negative  Negative Final    Fructosamine 01/03/2024 276  0 - 285 umol/L Final    Published reference interval for apparently healthy subjects  between age 20 and 60 is 205 - 285 umol/L and in a poorly  controlled diabetic population is 228 - 563 umol/L with a  mean of 396 umol/L.    WBC 01/03/2024 12.46 (H)  3.40 - 10.80 10*3/mm3 Final    RBC 01/03/2024 3.89  3.77 - 5.28 10*6/mm3 Final    Hemoglobin 01/03/2024 11.3 (L)  12.0 - 15.9 g/dL Final    Hematocrit 01/03/2024 34.9  34.0 - 46.6 % Final    MCV 01/03/2024 89.7  79.0 - 97.0 fL Final    MCH 01/03/2024 29.0  26.6 - 33.0 pg Final    MCHC 01/03/2024 32.4  31.5 - 35.7 g/dL Final    RDW 01/03/2024 12.9  12.3 - 15.4 % Final    RDW-SD 01/03/2024 42.4  37.0 - 54.0 fl Final    MPV 01/03/2024 12.9 (H)  6.0 - 12.0 fL Final    Platelets 01/03/2024 249  140 - 450 10*3/mm3 Final    Neutrophil % 01/03/2024 78.6 (H)  42.7 - 76.0 % Final    Lymphocyte % 01/03/2024 14.2 (L)  19.6 - 45.3 % Final    Monocyte % 01/03/2024 4.7 (L)  5.0 - 12.0 % Final    Eosinophil % 01/03/2024 1.4  0.3 - 6.2 % Final    Basophil % 01/03/2024 0.4  0.0 - 1.5 % Final    Immature Grans % 01/03/2024 0.7 (H)  0.0 - 0.5 % Final    Neutrophils, Absolute 01/03/2024 9.79 (H)  1.70 - 7.00 10*3/mm3 Final    Lymphocytes, Absolute 01/03/2024 1.77  0.70 - 3.10 10*3/mm3 Final    Monocytes, Absolute 01/03/2024 0.59  0.10 - 0.90 10*3/mm3 Final    Eosinophils, Absolute 01/03/2024 0.17  0.00 - 0.40 10*3/mm3 Final    Basophils, Absolute 01/03/2024 0.05  0.00 - 0.20 10*3/mm3 Final    Immature Grans, Absolute 01/03/2024 0.09 (H)  0.00 - 0.05 10*3/mm3 Final    nRBC 01/03/2024 0.0  0.0 - 0.2 /100 WBC Final    QT Interval 01/03/2024 358  ms Final    QTC Interval 01/03/2024 418   ms Final    Hemoglobin A1C 01/03/2024 6.20 (H)  4.80 - 5.60 % Final         Assessment:         Diagnoses and all orders for this visit:    Type 1 diabetes mellitus with hyperglycemia  -     POC Glucose, Blood  -     POC Glycosylated Hemoglobin (Hb A1C)    Acquired hypothyroidism          Plan:      -Insulin pump downloaded and data reviewed.   Her glucose is in range most of the time, she is using manual boluses.      -cont Levothyroxine 112 mcg daily.    Sees eye specialist every 4 weeks. Transplant team- once a year.   Nephrology visit every 4-6 months, kidney function was normal.   Recent labs reviewed.   Follow-up in 3 months

## 2024-04-18 DIAGNOSIS — Z46.81 INSULIN PUMP TITRATION: ICD-10-CM

## 2024-04-18 RX ORDER — INSULIN LISPRO 100 [IU]/ML
INJECTION, SOLUTION INTRAVENOUS; SUBCUTANEOUS
Qty: 90 ML | Refills: 3 | Status: SHIPPED | OUTPATIENT
Start: 2024-04-18 | End: 2024-04-22 | Stop reason: SDUPTHER

## 2024-04-22 ENCOUNTER — TELEPHONE (OUTPATIENT)
Dept: ENDOCRINOLOGY | Facility: CLINIC | Age: 44
End: 2024-04-22
Payer: COMMERCIAL

## 2024-04-22 DIAGNOSIS — Z46.81 INSULIN PUMP TITRATION: ICD-10-CM

## 2024-04-22 RX ORDER — INSULIN LISPRO 100 [IU]/ML
INJECTION, SOLUTION INTRAVENOUS; SUBCUTANEOUS
Qty: 90 ML | Refills: 3 | Status: SHIPPED | OUTPATIENT
Start: 2024-04-22

## 2024-04-22 NOTE — TELEPHONE ENCOUNTER
----- Message from Ellie Meyer sent at 4/22/2024 10:28 AM EDT -----  Regarding: Change to Insulin Prescription  Contact: [phone]  Good morning, my pharmacy did not receive the new insulin lispro  100 ml prescription. could you please resubmit it. Sorry for the inconvenience.

## 2024-04-22 NOTE — TELEPHONE ENCOUNTER
----- Message from Ellie Meyer sent at 4/22/2024 10:28 AM EDT -----  Regarding: Change to Insulin Prescription  Contact: [phone]  Good morning, my pharmacy did not receive the new insulin lispro  100 ml prescription. could you please resubmit it. Sorry for the inconvenience.

## 2024-05-10 ENCOUNTER — TELEPHONE (OUTPATIENT)
Dept: ENDOCRINOLOGY | Facility: CLINIC | Age: 44
End: 2024-05-10

## 2024-05-10 NOTE — TELEPHONE ENCOUNTER
[facility] CALLED TO F/U ON FORM THEY RECENTLY SENT. I SAW THAT A BRAND WAS NOT SELECTED ON THE FORM. PLEASE FILL OUT AND FAX OR REFAX

## 2024-07-10 ENCOUNTER — OFFICE VISIT (OUTPATIENT)
Dept: ENDOCRINOLOGY | Facility: CLINIC | Age: 44
End: 2024-07-10
Payer: COMMERCIAL

## 2024-07-10 VITALS — BODY MASS INDEX: 33.97 KG/M2 | HEIGHT: 64 IN | WEIGHT: 199 LBS | HEART RATE: 76 BPM

## 2024-07-10 DIAGNOSIS — Z46.81 INSULIN PUMP TITRATION: ICD-10-CM

## 2024-07-10 DIAGNOSIS — E03.9 ACQUIRED HYPOTHYROIDISM: ICD-10-CM

## 2024-07-10 DIAGNOSIS — E10.3552 STABLE PROLIFERATIVE DIABETIC RETINOPATHY OF LEFT EYE ASSOCIATED WITH TYPE 1 DIABETES MELLITUS: ICD-10-CM

## 2024-07-10 DIAGNOSIS — E10.65 TYPE 1 DIABETES MELLITUS WITH HYPERGLYCEMIA: Primary | ICD-10-CM

## 2024-07-10 LAB
EXPIRATION DATE: NORMAL
EXPIRATION DATE: NORMAL
GLUCOSE BLDC GLUCOMTR-MCNC: 82 MG/DL (ref 70–130)
HBA1C MFR BLD: 5.7 % (ref 4.5–5.7)
Lab: NORMAL
Lab: NORMAL

## 2024-07-10 PROCEDURE — 99214 OFFICE O/P EST MOD 30 MIN: CPT | Performed by: INTERNAL MEDICINE

## 2024-07-10 PROCEDURE — 82947 ASSAY GLUCOSE BLOOD QUANT: CPT | Performed by: INTERNAL MEDICINE

## 2024-07-10 PROCEDURE — 83036 HEMOGLOBIN GLYCOSYLATED A1C: CPT | Performed by: INTERNAL MEDICINE

## 2024-07-10 NOTE — PROGRESS NOTES
"Subjective:     Chief Complaint   Patient presents with    Diabetes      Ellie Meyer is a 44 y.o. female who is is being seen for follow-up  Type 1 diabetes mellitus.    The initial diagnosis of diabetes was made in childhood and she had variable glycemic control with multiple complications.  Diabetic complications: nephropathy, retinopathy s/p surgeries vitrectomy and multiple laser surgeries and injections; kidney disease s/p renal transplant. She is followed with the transplant team in [facility].     Current diabetic medications include insulin Humalog via insulin pump Medtronic 780. She has 670G but doesn't use sensor because of the cost.  She tests 10-12 times per day and keeps glucose in goal.      Monitoring  - Insulin pump downloaded and reviewed the data. She is not using the sensor, uses manual bolus.    Hypothyroidism is managed with Synthroid 112 mcg daily. She is taking a brand name medication. Labs reviewed.         MEDICATIONS    Current Outpatient Medications:     Drospirenone (Slynd) 4 MG tablet, Take 1 tablet by mouth Daily., Disp: , Rfl:     insulin lispro (Admelog) 100 UNIT/ML injection, 100 Units via Insulin Pump daily, Disp: 90 mL, Rfl: 3    levothyroxine (Synthroid) 112 MCG tablet, Take 1 tablet by mouth Daily., Disp: 90 tablet, Rfl: 3    losartan (COZAAR) 25 MG tablet, Take 1 tablet by mouth Daily., Disp: , Rfl:     methenamine (HIPREX) 1 g tablet, Take 1 tablet by mouth 2 (Two) Times a Day., Disp: , Rfl:     mycophenolate (CELLCEPT) 500 MG tablet, Take 1 tablet by mouth 2 (Two) Times a Day., Disp: , Rfl:     omeprazole (priLOSEC) 20 MG capsule, Take 1 capsule by mouth Daily., Disp: , Rfl:     tacrolimus (PROGRAF) 1 MG capsule, Take 1 capsule by mouth 2 (Two) Times a Day., Disp: , Rfl:     TRUEplus Insulin Syringe 31G X 5/16\" 0.3 ML misc, Use three times daily with Insulin distribution, Disp: 100 each, Rfl: 5    Review of Systems  Review of Systems   Constitutional:  Positive for " "fatigue.   Eyes:  Positive for redness and visual disturbance.   Cardiovascular:  Positive for leg swelling.   Skin:         Hair loss   Neurological:  Positive for dizziness and headache.   Psychiatric/Behavioral:  Positive for sleep disturbance.    All other systems reviewed and are negative.        Objective:      Pulse 76   Ht 162.6 cm (64.02\")   Wt 90.3 kg (199 lb)   BMI 34.14 kg/m² Body mass index is 34.14 kg/m².  Physical Exam   Constitutional: She is oriented to person, place, and time. She appears well-developed and well-nourished.   HENT:   Head: Normocephalic and atraumatic.   Cardiovascular: Normal rate, regular rhythm, normal heart sounds and normal pulses.   Pulmonary/Chest: Effort normal and breath sounds normal.   Neurological: She is alert and oriented to person, place, and time.   Psychiatric: Thought content normal.           LABS AND IMAGING    Labs:   Lab Results   Component Value Date    HGBA1C 5.7 07/10/2024    HGBA1C 5.7 03/12/2024    HGBA1C 6.20 (H) 01/03/2024     Office Visit on 07/10/2024   Component Date Value Ref Range Status    Glucose 07/10/2024 82  70 - 130 mg/dL Final    Lot Number 07/10/2024 [id number]   Final    Expiration Date 07/10/2024 10/05/2024   Final    Hemoglobin A1C 07/10/2024 5.7  4.5 - 5.7 % Final    Lot Number 07/10/2024 [id number]   Final    Expiration Date 07/10/2024 10/23/2023   Final         Assessment:         Diagnoses and all orders for this visit:    Type 1 diabetes mellitus with hyperglycemia  -     POC Glucose, Blood  -     POC Glycosylated Hemoglobin (Hb A1C)    Acquired hypothyroidism  -     TSH; Future  -     T4, Free; Future    Stable proliferative diabetic retinopathy of left eye associated with type 1 diabetes mellitus    Insulin pump titration          Plan:      -Insulin pump downloaded and data reviewed. Her glucose is in range most of the time, she is using manual boluses.      -cont Levothyroxine 112 mcg daily. Repeat labs with next lab " draw    Sees eye specialist every 4 weeks. Transplant team- once a year.   Nephrology visit every 4-6 months, kidney function was normal.   Recent labs reviewed.   Follow-up in 3 months

## 2024-07-26 RX ORDER — LEVOTHYROXINE SODIUM 112 UG/1
112 TABLET ORAL DAILY
Qty: 90 TABLET | Refills: 0 | Status: SHIPPED | OUTPATIENT
Start: 2024-07-26

## 2024-07-26 NOTE — TELEPHONE ENCOUNTER
Rx Refill Note  Requested Prescriptions      No prescriptions requested or ordered in this encounter      Last office visit with prescribing clinician: 7/10/2024     Next office visit with prescribing clinician: 9/6/2024

## 2024-09-06 ENCOUNTER — OFFICE VISIT (OUTPATIENT)
Dept: ENDOCRINOLOGY | Facility: CLINIC | Age: 44
End: 2024-09-06
Payer: COMMERCIAL

## 2024-09-06 VITALS
DIASTOLIC BLOOD PRESSURE: 74 MMHG | HEART RATE: 82 BPM | HEIGHT: 64 IN | SYSTOLIC BLOOD PRESSURE: 136 MMHG | WEIGHT: 190 LBS | OXYGEN SATURATION: 98 % | BODY MASS INDEX: 32.44 KG/M2

## 2024-09-06 DIAGNOSIS — E03.9 ACQUIRED HYPOTHYROIDISM: ICD-10-CM

## 2024-09-06 DIAGNOSIS — E10.3599 PROLIFERATIVE DIABETIC RETINOPATHY ASSOCIATED WITH TYPE 1 DIABETES MELLITUS, UNSPECIFIED LATERALITY, UNSPECIFIED PROLIFERATIVE RETINOPATHY TYPE: ICD-10-CM

## 2024-09-06 DIAGNOSIS — E10.65 TYPE 1 DIABETES MELLITUS WITH HYPERGLYCEMIA: Primary | ICD-10-CM

## 2024-09-06 LAB
EXPIRATION DATE: NORMAL
GLUCOSE BLDC GLUCOMTR-MCNC: 94 MG/DL (ref 70–130)
Lab: NORMAL

## 2024-09-06 PROCEDURE — 82947 ASSAY GLUCOSE BLOOD QUANT: CPT | Performed by: INTERNAL MEDICINE

## 2024-09-06 PROCEDURE — 99214 OFFICE O/P EST MOD 30 MIN: CPT | Performed by: INTERNAL MEDICINE

## 2024-09-06 RX ORDER — LEVOTHYROXINE SODIUM 112 UG/1
112 TABLET ORAL DAILY
Qty: 90 TABLET | Refills: 3 | Status: SHIPPED | OUTPATIENT
Start: 2024-09-06

## 2024-09-06 NOTE — PROGRESS NOTES
"Subjective:     Chief Complaint   Patient presents with    Diabetes      Ellie Meyer is a 44 y.o. female who is is being seen for follow-up  Type 1 diabetes mellitus.    The initial diagnosis of diabetes was made in childhood and she had variable glycemic control with multiple complications.  Diabetic complications: nephropathy, retinopathy s/p surgeries vitrectomy and multiple laser surgeries and injections; kidney disease s/p renal transplant. She is followed with the transplant team in [facility].     Current diabetic medications include insulin Humalog via insulin pump Medtronic 780. She has 670G but doesn't use sensor because of the cost.  She tests 10-12 times per day and keeps glucose in goal.      Monitoring  - Insulin pump downloaded and reviewed the data. She is not using the sensor, uses manual bolus.    Hypothyroidism is managed with Synthroid 112 mcg daily. She is taking a brand name medication.     Patient reported problem with the insertion site - itching and bleeding. She has noted it with current shipping. Glucose has been higher because of that     MEDICATIONS    Current Outpatient Medications:     Drospirenone (Slynd) 4 MG tablet, Take 1 tablet by mouth Daily., Disp: , Rfl:     insulin lispro (Admelog) 100 UNIT/ML injection, 100 Units via Insulin Pump daily, Disp: 90 mL, Rfl: 3    levothyroxine (Synthroid) 112 MCG tablet, Take 1 tablet by mouth Daily., Disp: 90 tablet, Rfl: 0    losartan (COZAAR) 25 MG tablet, Take 1 tablet by mouth Daily., Disp: , Rfl:     methenamine (HIPREX) 1 g tablet, Take 1 tablet by mouth 2 (Two) Times a Day., Disp: , Rfl:     mycophenolate (CELLCEPT) 500 MG tablet, Take 1 tablet by mouth 2 (Two) Times a Day., Disp: , Rfl:     omeprazole (priLOSEC) 20 MG capsule, Take 1 capsule by mouth Daily., Disp: , Rfl:     tacrolimus (PROGRAF) 1 MG capsule, Take 1 capsule by mouth 2 (Two) Times a Day., Disp: , Rfl:     TRUEplus Insulin Syringe 31G X 5/16\" 0.3 ML misc, Use three " "times daily with Insulin distribution, Disp: 100 each, Rfl: 5    Review of Systems  Review of Systems   Constitutional:  Positive for fatigue.   Eyes:  Positive for redness and visual disturbance.   Cardiovascular:  Positive for leg swelling.   Skin:         Hair loss   Neurological:  Positive for dizziness and headache.   Psychiatric/Behavioral:  Positive for sleep disturbance.    All other systems reviewed and are negative.        Objective:      /74   Pulse 82   Ht 162.6 cm (64.02\")   Wt 86.2 kg (190 lb)   SpO2 98%   BMI 32.60 kg/m² Body mass index is 32.6 kg/m².  Physical Exam   Constitutional: She is oriented to person, place, and time. She appears well-developed and well-nourished.   HENT:   Head: Normocephalic and atraumatic.   Cardiovascular: Normal rate, regular rhythm, normal heart sounds and normal pulses.   Pulmonary/Chest: Effort normal and breath sounds normal.   Neurological: She is alert and oriented to person, place, and time.   Psychiatric: Thought content normal.           LABS AND IMAGING    Labs:   Lab Results   Component Value Date    HGBA1C 5.7 07/10/2024    HGBA1C 5.7 03/12/2024    HGBA1C 6.20 (H) 01/03/2024     Office Visit on 09/06/2024   Component Date Value Ref Range Status    Glucose 09/06/2024 94  70 - 130 mg/dL Final    Lot Number 09/06/2024 [id number]   Final    Expiration Date 09/06/2024 1/20/2025   Final   Office Visit on 07/10/2024   Component Date Value Ref Range Status    Glucose 07/10/2024 82  70 - 130 mg/dL Final    Lot Number 07/10/2024 [id number]   Final    Expiration Date 07/10/2024 10/05/2024   Final    Hemoglobin A1C 07/10/2024 5.7  4.5 - 5.7 % Final    Lot Number 07/10/2024 [id number]   Final    Expiration Date 07/10/2024 10/23/2023   Final         Assessment:         Diagnoses and all orders for this visit:    Type 1 diabetes mellitus with hyperglycemia  -     POC Glucose, Blood    Acquired hypothyroidism    Proliferative diabetic retinopathy associated with " type 1 diabetes mellitus, unspecified laterality, unspecified proliferative retinopathy type          Plan:      -Insulin pump downloaded and data reviewed. Her glucose is in range most of the time, she is using manual boluses.   I will contact [employer] regarding the insertion site reaction, advised to try using flonase.      -cont Levothyroxine 112 mcg daily.     Sees eye specialist every 4 weeks. Transplant team- once a year.   Nephrology visit every 4-6 months, kidney function was normal.   Recent labs reviewed. Thyroid function is normal.     Follow-up in 3 months

## 2024-09-10 DIAGNOSIS — E03.9 ACQUIRED HYPOTHYROIDISM: ICD-10-CM

## 2024-10-02 RX ORDER — LEVOTHYROXINE SODIUM 112 UG/1
112 TABLET ORAL DAILY
Qty: 90 TABLET | Refills: 3 | Status: SHIPPED | OUTPATIENT
Start: 2024-10-02

## 2024-10-02 NOTE — TELEPHONE ENCOUNTER
Rx Refill Note  Requested Prescriptions     Pending Prescriptions Disp Refills    levothyroxine (Synthroid) 112 MCG tablet 90 tablet 3     Sig: Take 1 tablet by mouth Daily.      Last office visit with prescribing clinician: 9/6/2024   Last telemedicine visit with prescribing clinician: Visit date not found   Next office visit with prescribing clinician: 1/7/2025                         Would you like a call back once the refill request has been completed: [] Yes [] No    If the office needs to give you a call back, can they leave a voicemail: [] Yes [] No    Breanne Roger MA  10/02/24, 13:55 EDTRx Refill Note  Requested Prescriptions     Pending Prescriptions Disp Refills    levothyroxine (Synthroid) 112 MCG tablet 90 tablet 3     Sig: Take 1 tablet by mouth Daily.      Last office visit with prescribing clinician: 9/6/2024   Last telemedicine visit with prescribing clinician: Visit date not found   Next office visit with prescribing clinician: 1/7/2025                         Would you like a call back once the refill request has been completed: [] Yes [] No    If the office needs to give you a call back, can they leave a voicemail: [] Yes [] No    Breanne Roger MA  10/02/24, 13:55 EDT

## 2024-10-09 ENCOUNTER — OFFICE VISIT (OUTPATIENT)
Dept: INTERNAL MEDICINE | Facility: CLINIC | Age: 44
End: 2024-10-09
Payer: COMMERCIAL

## 2024-10-09 VITALS
WEIGHT: 198 LBS | SYSTOLIC BLOOD PRESSURE: 126 MMHG | OXYGEN SATURATION: 100 % | HEIGHT: 64 IN | DIASTOLIC BLOOD PRESSURE: 78 MMHG | BODY MASS INDEX: 33.8 KG/M2 | HEART RATE: 58 BPM

## 2024-10-09 DIAGNOSIS — Z76.89 ENCOUNTER TO ESTABLISH CARE WITH NEW DOCTOR: ICD-10-CM

## 2024-10-09 DIAGNOSIS — Z13.0 SCREENING FOR ENDOCRINE, NUTRITIONAL, METABOLIC AND IMMUNITY DISORDER: ICD-10-CM

## 2024-10-09 DIAGNOSIS — Z12.11 SCREENING FOR COLON CANCER: ICD-10-CM

## 2024-10-09 DIAGNOSIS — Z13.21 SCREENING FOR ENDOCRINE, NUTRITIONAL, METABOLIC AND IMMUNITY DISORDER: ICD-10-CM

## 2024-10-09 DIAGNOSIS — C44.219 BASAL CELL CARCINOMA (BCC) OF SKIN OF LEFT EAR: ICD-10-CM

## 2024-10-09 DIAGNOSIS — Z11.59 NEED FOR HEPATITIS C SCREENING TEST: ICD-10-CM

## 2024-10-09 DIAGNOSIS — Z94.0 KIDNEY TRANSPLANT RECIPIENT: ICD-10-CM

## 2024-10-09 DIAGNOSIS — Z13.220 SCREENING, LIPID: ICD-10-CM

## 2024-10-09 DIAGNOSIS — Z00.00 WELLNESS EXAMINATION: Primary | ICD-10-CM

## 2024-10-09 DIAGNOSIS — Z13.228 SCREENING FOR ENDOCRINE, NUTRITIONAL, METABOLIC AND IMMUNITY DISORDER: ICD-10-CM

## 2024-10-09 DIAGNOSIS — E10.65 TYPE 1 DIABETES MELLITUS WITH HYPERGLYCEMIA: ICD-10-CM

## 2024-10-09 DIAGNOSIS — Z13.29 SCREENING FOR ENDOCRINE, NUTRITIONAL, METABOLIC AND IMMUNITY DISORDER: ICD-10-CM

## 2024-10-09 PROBLEM — R39.15 URINARY URGENCY: Status: RESOLVED | Noted: 2020-09-21 | Resolved: 2024-10-09

## 2024-10-09 PROBLEM — N18.2 CHRONIC KIDNEY DISEASE, STAGE 2 (MILD): Status: ACTIVE | Noted: 2023-07-27

## 2024-10-09 PROBLEM — N18.2 CHRONIC KIDNEY DISEASE, STAGE 2 (MILD): Status: RESOLVED | Noted: 2023-07-27 | Resolved: 2024-10-09

## 2024-10-09 PROBLEM — N39.41 URGE INCONTINENCE: Status: RESOLVED | Noted: 2020-09-21 | Resolved: 2024-10-09

## 2024-10-09 PROBLEM — R39.89 URETHRAL PAIN: Status: RESOLVED | Noted: 2020-09-21 | Resolved: 2024-10-09

## 2024-10-09 PROBLEM — K21.9 GASTRO-ESOPHAGEAL REFLUX: Status: ACTIVE | Noted: 2023-07-27

## 2024-10-09 PROBLEM — E28.2 PCOS (POLYCYSTIC OVARIAN SYNDROME): Status: ACTIVE | Noted: 2024-10-09

## 2024-10-09 PROCEDURE — 99386 PREV VISIT NEW AGE 40-64: CPT | Performed by: FAMILY MEDICINE

## 2024-10-09 NOTE — PROGRESS NOTES
Office Note     Name: Ellie Meyer    : 1980     MRN: [id number]     Chief Complaint  Establish Care    Subjective     History of Present Illness:  Ellie Meyer is a 44 y.o. female who presents today for establish care- concerns for medications  Renal transplant patient 2011- DM1- follows with nephrology   Follows with Urology     PMH-  DM1   Insulin pump   Hypothyroidism   Diabetic retinopathy- right eye   PFD   Recurrent UTI   Kidney transplant recipient   GERD  CKD2   Hx of patella fracture   Basal cell carcinoma   PCOS     Meds-  Slynd daily   Insulin   Levothyroxine 112mcg daily   Losartan 25mg daily   Methenamine 1g twice day   Cellcept 500mg twice  a day   Prograf 1mg twice a day   Prilosec 20mg daily     Family Hx-   Maternal GM-   Maternal GF-    Paternal GM-    Paternal GF-    Mother- - COVID   Father- - liver cancer   Sister- alive- lymes disease   Brother- - brain disease     Alcohol - none   Smoking - none   Drug use none   Job retired- orthodontic assistant   Stress 8/10   Sun Exposure - protects skin. Follows with Dermatology- [facility]     Dental/Eye exams - up to date. Vision is stable   Diet/Exercise- Diet- Healthy- moderate. Homecooked   Exercise- walks daily. Stays active     OBGYN - Lenka Walsh NP   PAP 2024- normal   Mammo - not yet- ordered per GYN- will schedule   DEXA not yet   BC/Hormone replacement BC pill   Vitamin D - none      Colonoscopy/Cologuard - not yet. Cologuard-ordered  Hold on colonoscopy due to anesthesia concerns     Immunizations  Tdap- - due to Tdap- will check with nephrology   Flu- postponed   Shingles- not yet   COVID- postponed   PNA- will hold     Review of Systems:   Review of Systems   Constitutional:  Negative for chills and fever.   HENT:  Negative for dental problem, trouble swallowing and voice change.    Eyes:  Negative for visual disturbance.   Respiratory:   Negative for cough, chest tightness, shortness of breath and wheezing.    Cardiovascular:  Negative for chest pain, palpitations and leg swelling.   Gastrointestinal:  Negative for abdominal pain, blood in stool, constipation, diarrhea, nausea and vomiting.   Genitourinary:  Negative for dysuria.   Musculoskeletal:  Negative for gait problem.   Skin:  Negative for rash.   Neurological:  Negative for light-headedness and headache.   Psychiatric/Behavioral:  Negative for dysphoric mood.        Past Medical History:   Past Medical History:   Diagnosis Date    Acid reflux     Diabetes mellitus type I     Hypertension     Hypothyroidism     PCOS (polycystic ovarian syndrome)     PCOS (polycystic ovarian syndrome) 10/9/2024       Past Surgical History:   Past Surgical History:   Procedure Laterality Date    CATARACT EXTRACTION W/ INTRAOCULAR LENS  IMPLANT, BILATERAL Bilateral     EYE SURGERY Right     PANCREAS SURGERY      PANCREAS TRANSPLANT, BUT DONOR PANCREAS WAS DAMAGED, SO SURGERY NOT COMPLETED    PATELLA OPEN REDUCTION INTERNAL FIXATION Right 01/04/2024    Procedure: PATELLA OPEN REDUCTION INTERNAL FIXATION RIGHT;  Surgeon: Girish Castillo MD;  Location: [facility];  Service: Orthopedics;  Laterality: Right;    TRANSPLANTATION RENAL Left        Immunizations:   Immunization History   Administered Date(s) Administered    Fluzone (or Fluarix & Flulaval for VFC) >6mos 10/28/2019, 10/05/2020    Influenza, Unspecified 10/09/2018, 10/01/2020        Medications:     Current Outpatient Medications:     Drospirenone (Slynd) 4 MG tablet, Take 1 tablet by mouth Daily., Disp: , Rfl:     insulin lispro (Admelog) 100 UNIT/ML injection, 100 Units via Insulin Pump daily, Disp: 90 mL, Rfl: 3    levothyroxine (Synthroid) 112 MCG tablet, Take 1 tablet by mouth Daily., Disp: 90 tablet, Rfl: 3    losartan (COZAAR) 25 MG tablet, Take 1 tablet by mouth Daily., Disp: , Rfl:     methenamine (HIPREX) 1 g tablet, Take 1 tablet by mouth 2  "(Two) Times a Day., Disp: , Rfl:     mycophenolate (CELLCEPT) 500 MG tablet, Take 1 tablet by mouth 2 (Two) Times a Day., Disp: , Rfl:     omeprazole (priLOSEC) 20 MG capsule, Take 1 capsule by mouth Daily., Disp: , Rfl:     tacrolimus (PROGRAF) 1 MG capsule, Take 1 capsule by mouth 2 (Two) Times a Day., Disp: , Rfl:     TRUEplus Insulin Syringe 31G X 5/16\" 0.3 ML misc, Use three times daily with Insulin distribution, Disp: 100 each, Rfl: 5    Allergies:   Allergies   Allergen Reactions    Contrast Dye (Echo Or Unknown Ct/Mr) Shortness Of Breath, Itching and Nausea And Vomiting    Doxycycline Hives and Nausea And Vomiting    Doxylamine Nausea And Vomiting    Iodinated Contrast Media Other (See Comments)       Family History:   Family History   Problem Relation Age of Onset    Liver cancer Father     Other (Lymes disease) Sister        Social History:   Social History     Socioeconomic History    Marital status:    Tobacco Use    Smoking status: Never     Passive exposure: Never    Smokeless tobacco: Never   Vaping Use    Vaping status: Never Used   Substance and Sexual Activity    Alcohol use: Never    Drug use: Never    Sexual activity: Defer         Objective     Vital Signs  /78   Pulse 58   Ht 162.6 cm (64.02\")   Wt 89.8 kg (198 lb)   SpO2 100%   BMI 33.97 kg/m²   Estimated body mass index is 33.97 kg/m² as calculated from the following:    Height as of this encounter: 162.6 cm (64.02\").    Weight as of this encounter: 89.8 kg (198 lb).    BMI is >= 30 and <35. (Class 1 Obesity). The following options were offered after discussion;: exercise counseling/recommendations and nutrition counseling/recommendations      Physical Exam  Vitals and nursing note reviewed.   Constitutional:       General: She is not in acute distress.     Appearance: Normal appearance.   HENT:      Head: Normocephalic and atraumatic.      Right Ear: Tympanic membrane normal.      Left Ear: Tympanic membrane normal.      " Nose: Nose normal.      Mouth/Throat:      Mouth: Mucous membranes are moist.   Eyes:      Extraocular Movements: Extraocular movements intact.      Conjunctiva/sclera: Conjunctivae normal.      Pupils: Pupils are equal, round, and reactive to light.   Cardiovascular:      Rate and Rhythm: Normal rate and regular rhythm.      Heart sounds: Normal heart sounds.   Pulmonary:      Effort: Pulmonary effort is normal. No respiratory distress.      Breath sounds: Normal breath sounds.   Abdominal:      General: Bowel sounds are normal.      Palpations: Abdomen is soft.   Musculoskeletal:         General: Normal range of motion.      Cervical back: Normal range of motion.      Comments: Moving all extremities    Skin:     General: Skin is warm and dry.   Neurological:      General: No focal deficit present.      Mental Status: She is alert and oriented to person, place, and time.   Psychiatric:         Mood and Affect: Mood normal.         Behavior: Behavior normal.         Thought Content: Thought content normal.         Judgment: Judgment normal.          Procedures     Assessment and Plan   Diagnosis Discussed   Continue to monitor   Plenty of fluids, monitor diet and exercise   Labs ordered will notify of results - lab ordered given   Immunizations up to date - will check on Tdap with Nephrology   Follow up with Nephrology   Follow up with Urology   Follow up with Endocrinology   Follow up with Dermatology- skin checks   Cologuard ordered- screening colon cancer  Take medications as instructed  Follow up as directed   If symptoms worsen or persist please seek further evaluation     1. Wellness examination    2. Encounter to establish care with new doctor  Reviewed PMH, medications and specialists     3. Screening for colon cancer  - Cologuard - Stool, Per Rectum; Future    4. Kidney transplant recipient  Follows with  nephrology and Urology   Stable.     5. Type 1 diabetes mellitus with hyperglycemia  - Hemoglobin  A1c; Future    6. Basal cell carcinoma (BCC) of skin of left ear  Follow up with Dermatology annually- skin checks     7. Screening for endocrine, nutritional, metabolic and immunity disorder  - CBC (No Diff); Future  - Comprehensive Metabolic Panel; Future  - TSH Rfx On Abnormal To Free T4; Future    8. Screening, lipid  - Lipid Panel; Future    9. Need for hepatitis C screening test  - Hepatitis C antibody; Future       Follow Up  Return in about 1 year (around 10/9/2025), or if symptoms worsen or fail to improve, for Annual, fasting labs.    Bronwyn Leong MD  [facility]  [address] [place] [id number] [phone]

## 2024-10-09 NOTE — PATIENT INSTRUCTIONS
Diagnosis Discussed   Continue to monitor   Plenty of fluids, monitor diet and exercise   Labs ordered will notify of results - lab ordered given   Immunizations up to date - will check on Tdap with Nephrology   Follow up with Nephrology   Follow up with Urology   Follow up with Endocrinology   Follow up with Dermatology- skin checks   Cologuard ordered- screening colon cancer  Take medications as instructed  Follow up as directed   If symptoms worsen or persist please seek further evaluation

## 2024-10-10 ENCOUNTER — PATIENT ROUNDING (BHMG ONLY) (OUTPATIENT)
Dept: INTERNAL MEDICINE | Facility: CLINIC | Age: 44
End: 2024-10-10
Payer: COMMERCIAL

## 2024-10-10 NOTE — PROGRESS NOTES
A My-chart message has been sent to the patient for [facility] [place] [place] [facility]

## 2024-12-30 ENCOUNTER — OFFICE VISIT (OUTPATIENT)
Dept: ENDOCRINOLOGY | Facility: CLINIC | Age: 44
End: 2024-12-30
Payer: COMMERCIAL

## 2024-12-30 VITALS
WEIGHT: 203 LBS | SYSTOLIC BLOOD PRESSURE: 138 MMHG | OXYGEN SATURATION: 100 % | BODY MASS INDEX: 34.66 KG/M2 | HEIGHT: 64 IN | HEART RATE: 80 BPM | DIASTOLIC BLOOD PRESSURE: 94 MMHG

## 2024-12-30 DIAGNOSIS — E03.9 ACQUIRED HYPOTHYROIDISM: ICD-10-CM

## 2024-12-30 DIAGNOSIS — E10.65 TYPE 1 DIABETES MELLITUS WITH HYPERGLYCEMIA: Primary | ICD-10-CM

## 2024-12-30 LAB
EXPIRATION DATE: ABNORMAL
EXPIRATION DATE: NORMAL
GLUCOSE BLDC GLUCOMTR-MCNC: 130 MG/DL (ref 70–130)
HBA1C MFR BLD: 6.1 % (ref 4.5–5.7)
Lab: ABNORMAL
Lab: NORMAL

## 2024-12-30 PROCEDURE — 82947 ASSAY GLUCOSE BLOOD QUANT: CPT | Performed by: INTERNAL MEDICINE

## 2024-12-30 PROCEDURE — 83036 HEMOGLOBIN GLYCOSYLATED A1C: CPT | Performed by: INTERNAL MEDICINE

## 2024-12-30 PROCEDURE — 99214 OFFICE O/P EST MOD 30 MIN: CPT | Performed by: INTERNAL MEDICINE

## 2024-12-30 RX ORDER — SYRING-NEEDL,DISP,INSUL,0.3 ML 31 GX5/16"
SYRINGE, EMPTY DISPOSABLE MISCELLANEOUS
Qty: 100 EACH | Refills: 5 | Status: SHIPPED | OUTPATIENT
Start: 2024-12-30

## 2024-12-30 RX ORDER — CLOBETASOL PROPIONATE 0.5 MG/G
OINTMENT TOPICAL
COMMUNITY
Start: 2024-11-24

## 2024-12-30 NOTE — PROGRESS NOTES
Subjective:     Chief Complaint   Patient presents with    Diabetes      Ellie Meyer is a 44 y.o. female who is is being seen for follow-up  Type 1 diabetes mellitus.    The initial diagnosis of diabetes was made in childhood and she had variable glycemic control with multiple complications.  Diabetic complications: nephropathy, retinopathy s/p surgeries vitrectomy and multiple laser surgeries and injections; kidney disease s/p renal transplant. She is followed with the [facility] team in [facility].     Current diabetic medications include insulin Humalog via insulin pump Medtronic 780. She has 670G but doesn't use sensor because of the cost.  She tests 10-12 times per day and keeps glucose at goal.      Monitoring  - Insulin pump downloaded and reviewed the data. She is not using the sensor, uses manual bolus.    Hypothyroidism is managed with Synthroid 112 mcg daily. She is taking a brand name medication.     Patient reported several episodes of hypoglycemia in the middle of the night, later in the day glucose levels are higher.     MEDICATIONS    Current Outpatient Medications:     clobetasol (TEMOVATE) 0.05 % ointment, APPLY TO AFFECTED AREA ON LEG TWICE DAILY FOR TWO WEEKS THEN TAKE A WEEK BREAK THEN REPEAT AS NEEDED, Disp: , Rfl:     Drospirenone (Slynd) 4 MG tablet, Take 1 tablet by mouth Daily., Disp: , Rfl:     insulin lispro (Admelog) 100 UNIT/ML injection, 100 Units via Insulin Pump daily, Disp: 90 mL, Rfl: 3    levothyroxine (Synthroid) 112 MCG tablet, Take 1 tablet by mouth Daily., Disp: 90 tablet, Rfl: 3    losartan (COZAAR) 25 MG tablet, Take 1 tablet by mouth Daily., Disp: , Rfl:     methenamine (HIPREX) 1 g tablet, Take 1 tablet by mouth 2 (Two) Times a Day., Disp: , Rfl:     mycophenolate (CELLCEPT) 500 MG tablet, Take 1 tablet by mouth 2 (Two) Times a Day., Disp: , Rfl:     omeprazole (priLOSEC) 20 MG capsule, Take 1 capsule by mouth Daily., Disp: , Rfl:     tacrolimus (PROGRAF) 1 MG  "capsule, Take 1 capsule by mouth 2 (Two) Times a Day., Disp: , Rfl:     TRUEplus Insulin Syringe 31G X 5/16\" 0.3 ML misc, Use three times daily with Insulin distribution, Disp: 100 each, Rfl: 5    Review of Systems  Review of Systems   Constitutional:  Positive for fatigue.   Eyes:  Positive for redness and visual disturbance.   Cardiovascular:  Positive for leg swelling.   Skin:         Hair loss   Neurological:  Positive for dizziness and headache.   Psychiatric/Behavioral:  Positive for sleep disturbance.    All other systems reviewed and are negative.        Objective:      /94   Pulse 80   Ht 162.6 cm (64\")   Wt 92.1 kg (203 lb)   SpO2 100%   BMI 34.84 kg/m² Body mass index is 34.84 kg/m².  Physical Exam   Constitutional: She is oriented to person, place, and time. She appears well-developed and well-nourished.   HENT:   Head: Normocephalic and atraumatic.   Cardiovascular: Normal rate, regular rhythm, normal heart sounds and normal pulses.   Pulmonary/Chest: Effort normal and breath sounds normal.   Neurological: She is alert and oriented to person, place, and time.   Psychiatric: Thought content normal.           LABS AND IMAGING    Labs:   Lab Results   Component Value Date    HGBA1C 6.1 (A) 12/30/2024    HGBA1C 5.7 07/10/2024    HGBA1C 5.7 03/12/2024     Office Visit on 12/30/2024   Component Date Value Ref Range Status    Hemoglobin A1C 12/30/2024 6.1 (A)  4.5 - 5.7 % Final    Lot Number 12/30/2024 [id number]   Final    Expiration Date 12/30/2024 10/11/26   Final    Glucose 12/30/2024 130  70 - 130 mg/dL Final    Lot Number 12/30/2024 [id number]   Final    Expiration Date 12/30/2024 7/26/25   Final         Assessment:         Diagnoses and all orders for this visit:    Type 1 diabetes mellitus with hyperglycemia  -     POC Glycosylated Hemoglobin (Hb A1C)  -     POC Glucose, Blood    Acquired hypothyroidism    Other orders  -     clobetasol (TEMOVATE) 0.05 % ointment; APPLY TO AFFECTED AREA ON " "LEG TWICE DAILY FOR TWO WEEKS THEN TAKE A WEEK BREAK THEN REPEAT AS NEEDED  -     TRUEplus Insulin Syringe 31G X 5/16\" 0.3 ML misc; Use three times daily with Insulin distribution          Plan:      -Insulin pump downloaded and data reviewed. Her glucose is in range most of the time, she is using manual boluses. Her basal rate is variable during the day - 4 u/hr in the evening and 1.5 during the day. I have decreased basal rate after 10 pm to avoid nighttime hypoglycemia.      -cont Levothyroxine 112 mcg daily.     Sees eye specialist every 4 weeks. Transplant team- once a year.   Nephrology visit every 4-6 months, kidney function was normal.   Recent labs reviewed. Thyroid function was normal.     Follow-up in 3 months  "

## 2025-01-15 ENCOUNTER — TELEPHONE (OUTPATIENT)
Dept: ENDOCRINOLOGY | Facility: CLINIC | Age: 45
End: 2025-01-15
Payer: COMMERCIAL

## 2025-01-15 NOTE — TELEPHONE ENCOUNTER
PATIENT NEEDS A PRESCRIPTION FOR GENERIC NOVOLOG WHICH IS INSULIN ASPART. SHE DOES NOT FEEL INSULIN LISPRO WORKS AS WELL. SHE NEEDS NEW PRESCRIPTION FOR INSULIN ASPART CALLED INTO [facility] IN [place]. PATIENTS NUMBER [phone]. SHE HAS SENT A FEW MESSAGES IN [facility] REQUESTING NEW PRESCRIPTION TO BE SENT TO PHARMACY.

## 2025-01-16 DIAGNOSIS — Z46.81 INSULIN PUMP TITRATION: ICD-10-CM

## 2025-01-16 RX ORDER — INSULIN ASPART 100 [IU]/ML
INJECTION, SOLUTION INTRAVENOUS; SUBCUTANEOUS
Qty: 90 ML | Refills: 1 | Status: SHIPPED | OUTPATIENT
Start: 2025-01-16

## 2025-01-16 NOTE — TELEPHONE ENCOUNTER
Rx Refill Note  Requested Prescriptions      No prescriptions requested or ordered in this encounter        Last office visit with prescribing clinician: 12/30/2024      Next office visit with prescribing clinician: 4/4/2025       Ashly Lakhani (Jodi)  01/16/25, 14:10 EST       Patient has sent several message about changing her insulin to novolog (aspart).  Pended refill

## 2025-04-04 ENCOUNTER — OFFICE VISIT (OUTPATIENT)
Dept: ENDOCRINOLOGY | Facility: CLINIC | Age: 45
End: 2025-04-04
Payer: COMMERCIAL

## 2025-04-04 VITALS
SYSTOLIC BLOOD PRESSURE: 124 MMHG | HEART RATE: 81 BPM | WEIGHT: 200 LBS | DIASTOLIC BLOOD PRESSURE: 76 MMHG | HEIGHT: 64 IN | BODY MASS INDEX: 34.15 KG/M2 | OXYGEN SATURATION: 99 %

## 2025-04-04 DIAGNOSIS — E10.65 TYPE 1 DIABETES MELLITUS WITH HYPERGLYCEMIA: Primary | ICD-10-CM

## 2025-04-04 DIAGNOSIS — E03.9 ACQUIRED HYPOTHYROIDISM: ICD-10-CM

## 2025-04-04 DIAGNOSIS — E10.3599 PROLIFERATIVE DIABETIC RETINOPATHY ASSOCIATED WITH TYPE 1 DIABETES MELLITUS, UNSPECIFIED LATERALITY, UNSPECIFIED PROLIFERATIVE RETINOPATHY TYPE: ICD-10-CM

## 2025-04-04 LAB
EXPIRATION DATE: NORMAL
EXPIRATION DATE: NORMAL
GLUCOSE BLDC GLUCOMTR-MCNC: 83 MG/DL (ref 70–130)
HBA1C MFR BLD: 5.6 % (ref 4.5–5.7)
Lab: NORMAL
Lab: NORMAL

## 2025-04-04 NOTE — PROGRESS NOTES
Subjective:     Chief Complaint   Patient presents with    Thyroid Problem     Type 1 diabetes mellitus with hyperglycemia        Ellie Meyer is a 44 y.o. female who is is being seen for follow-up  Type 1 diabetes mellitus.    The initial diagnosis of diabetes was made in childhood and she had variable glycemic control with multiple complications.  Diabetic complications: nephropathy, retinopathy s/p surgeries vitrectomy and multiple laser surgeries and injections; kidney disease s/p renal transplant. She is followed with the transplant team in [facility].     Current diabetic medications include insulin Humalog via insulin pump Medtronic 780. She has 670G but doesn't use sensor because of the cost.  She tests 10-12 times per day and keeps glucose at goal.      Monitoring  - Insulin pump downloaded and reviewed the data. She is not using the sensor, uses manual bolus.    Hypothyroidism is managed with Synthroid 112 mcg daily. She is taking a brand name medication.         MEDICATIONS    Current Outpatient Medications:     clobetasol (TEMOVATE) 0.05 % ointment, APPLY TO AFFECTED AREA ON LEG TWICE DAILY FOR TWO WEEKS THEN TAKE A WEEK BREAK THEN REPEAT AS NEEDED, Disp: , Rfl:     Drospirenone (Slynd) 4 MG tablet, Take 1 tablet by mouth Daily., Disp: , Rfl:     Insulin Aspart (NovoLOG) 100 UNIT/ML injection, Use as directed in insulin pump mdd 100 units, Disp: 90 mL, Rfl: 1    insulin lispro (Admelog) 100 UNIT/ML injection, 100 Units via Insulin Pump daily, Disp: 90 mL, Rfl: 3    levothyroxine (Synthroid) 112 MCG tablet, Take 1 tablet by mouth Daily., Disp: 90 tablet, Rfl: 3    losartan (COZAAR) 25 MG tablet, Take 1 tablet by mouth Daily., Disp: , Rfl:     methenamine (HIPREX) 1 g tablet, Take 1 tablet by mouth 2 (Two) Times a Day., Disp: , Rfl:     mycophenolate (CELLCEPT) 500 MG tablet, Take 1 tablet by mouth 2 (Two) Times a Day., Disp: , Rfl:     omeprazole (priLOSEC) 20 MG capsule, Take 1 capsule by mouth  "Daily., Disp: , Rfl:     tacrolimus (PROGRAF) 1 MG capsule, Take 1 capsule by mouth 2 (Two) Times a Day., Disp: , Rfl:     TRUEplus Insulin Syringe 31G X 5/16\" 0.3 ML misc, Use three times daily with Insulin distribution, Disp: 100 each, Rfl: 5    Review of Systems  Review of Systems   Constitutional:  Positive for fatigue.   Eyes:  Positive for redness and visual disturbance.   Cardiovascular:  Positive for leg swelling.   Skin:         Hair loss   Neurological:  Positive for dizziness and headache.   Psychiatric/Behavioral:  Positive for sleep disturbance.    All other systems reviewed and are negative.        Objective:      /76 (BP Location: Left arm, Patient Position: Sitting, Cuff Size: Adult)   Pulse 81   Ht 162.6 cm (64\")   Wt 90.7 kg (200 lb)   SpO2 99%   BMI 34.33 kg/m² Body mass index is 34.33 kg/m².  Physical Exam  Vitals reviewed.   Constitutional:       Appearance: Normal appearance.   Cardiovascular:      Rate and Rhythm: Normal rate and regular rhythm.      Pulses: Normal pulses.      Heart sounds: Normal heart sounds.   Pulmonary:      Effort: Pulmonary effort is normal.      Breath sounds: Normal breath sounds.   Musculoskeletal:         General: No swelling.   Neurological:      Mental Status: She is alert and oriented to person, place, and time.   Psychiatric:         Mood and Affect: Mood normal.         Thought Content: Thought content normal.                LABS AND IMAGING    Labs:   Lab Results   Component Value Date    HGBA1C 6.1 (A) 12/30/2024    HGBA1C 5.7 07/10/2024    HGBA1C 5.7 03/12/2024     Office Visit on 04/04/2025   Component Date Value Ref Range Status    Glucose 04/04/2025 83  70 - 130 mg/dL Final    Lot Number 04/04/2025 [id number]   Final    Expiration Date 04/04/2025 9/11/2025   Final         Assessment:         Diagnoses and all orders for this visit:    Type 1 diabetes mellitus with hyperglycemia  -     POC Glycosylated Hemoglobin (Hb A1C)  -     POC Glucose, " Blood    Acquired hypothyroidism    Proliferative diabetic retinopathy associated with type 1 diabetes mellitus, unspecified laterality, unspecified proliferative retinopathy type          Plan:      -Insulin pump downloaded and data reviewed. Her glucose is in range most of the time, she is using manual boluses.      -cont Levothyroxine 112 mcg daily.     Sees eye specialist every 4 weeks. Transplant team- once a year.   Nephrology visit every 4-6 months, kidney function was normal.   Recent labs reviewed. Thyroid function was normal.     Follow-up in 3 months

## 2025-07-10 ENCOUNTER — TELEPHONE (OUTPATIENT)
Dept: ENDOCRINOLOGY | Facility: CLINIC | Age: 45
End: 2025-07-10
Payer: COMMERCIAL

## 2025-07-15 RX ORDER — INSULIN ASPART 100 [IU]/ML
INJECTION, SOLUTION INTRAVENOUS; SUBCUTANEOUS
Qty: 90 ML | Refills: 2 | Status: SHIPPED | OUTPATIENT
Start: 2025-07-15

## (undated) DEVICE — BNDG ELAS W/CLIP 6IN 10YD LF STRL

## (undated) DEVICE — MARKER,SKIN,W/RULER,DUAL,STOP: Brand: MEDLINE

## (undated) DEVICE — GOWN SURG ORBIS LVL3 2XL STRL

## (undated) DEVICE — DRP C/ARMOR

## (undated) DEVICE — GLV SURG SENSICARE PI ORTHO SZ8.5 LF STRL

## (undated) DEVICE — NEEDLE,HYPODERM,SAFETY, 22GX1.5: Brand: MEDLINE

## (undated) DEVICE — TBG PENCL TELESCP MEGADYNE SMOKE EVAC 10FT

## (undated) DEVICE — SKN PREP SPNG STKS PVP PNT STR: Brand: MEDLINE INDUSTRIES, INC.

## (undated) DEVICE — KT PUMP INFUBLOCK MDL 2100 PMKITSOLIS

## (undated) DEVICE — GLV SURG PREMIERPRO MIC LTX PF SZ8 BRN

## (undated) DEVICE — ANTIBACTERIAL UNDYED BRAIDED (POLYGLACTIN 910), SYNTHETIC ABSORBABLE SUTURE: Brand: COATED VICRYL

## (undated) DEVICE — COVER,C-ARM,41X74: Brand: MEDLINE

## (undated) DEVICE — PATIENT RETURN ELECTRODE, SINGLE-USE, CONTACT QUALITY MONITORING, ADULT, WITH 9FT CORD, FOR PATIENTS WEIGING OVER 33LBS. (15KG): Brand: MEGADYNE

## (undated) DEVICE — GLV SURG SENSICARE PI LF PF 7.0

## (undated) DEVICE — UNDERGLV SURG BIOGEL INDICAT PI SZ8.5 BLU

## (undated) DEVICE — GLV SURG SENSICARE PI LF PF 7.5

## (undated) DEVICE — DRAPE,TOP,102X53,STERILE: Brand: MEDLINE

## (undated) DEVICE — GLV SURG PREMIERPRO MIC LTX PF SZ7 BRN

## (undated) DEVICE — PCH INST SURG INVISISHIELD 2PCKT

## (undated) DEVICE — SHEET,DRAPE,53X77,STERILE: Brand: MEDLINE

## (undated) DEVICE — BNDG ELAS CO-FLEX SLF ADHR 6IN 5YD LF STRL

## (undated) DEVICE — PREMIUM DRY TRAY LF: Brand: MEDLINE INDUSTRIES, INC.

## (undated) DEVICE — DRSNG WND STRIP OPTIFOAM AG SUPRABS A/MIC 4X10IN STRL

## (undated) DEVICE — Device

## (undated) DEVICE — DRAPE,U/ SHT,SPLIT,PLAS,STERIL: Brand: MEDLINE

## (undated) DEVICE — STRIP,CLOSURE,WOUND,MEDI-STRIP,1/2X4: Brand: MEDLINE

## (undated) DEVICE — UNDERCAST PADDING: Brand: DEROYAL

## (undated) DEVICE — SCRB SURG BACTOSHIELD CHG 4PCT 4OZ

## (undated) DEVICE — SOL ISO/ALC RUB 70PCT 4OZ

## (undated) DEVICE — GLV SURG PREMIERPRO MIC LTX PF SZ7.5 BRN

## (undated) DEVICE — SHEET, DRAPE, SPLIT, STERILE: Brand: MEDLINE

## (undated) DEVICE — GLV SURG SENSICARE PI MIC PF SZ9 LF STRL

## (undated) DEVICE — SYR CONTRL PRESS/LO FIX/M/LL W/THMB/RNG 10ML

## (undated) DEVICE — HANDPIECE SET WITH HIGH FLOW TIP AND SUCTION TUBE: Brand: INTERPULSE

## (undated) DEVICE — ADHS SKIN PREMIERPRO EXOFIN TOPICAL HI/VISC .5ML

## (undated) DEVICE — SUT MNCRYL PLS ANTIB UD 3/0 PS2 27IN

## (undated) DEVICE — PK EXTREM LOWR 10